# Patient Record
Sex: FEMALE | Race: OTHER | NOT HISPANIC OR LATINO | URBAN - METROPOLITAN AREA
[De-identification: names, ages, dates, MRNs, and addresses within clinical notes are randomized per-mention and may not be internally consistent; named-entity substitution may affect disease eponyms.]

---

## 2017-02-18 ENCOUNTER — OUTPATIENT (OUTPATIENT)
Dept: OUTPATIENT SERVICES | Facility: HOSPITAL | Age: 62
LOS: 1 days | End: 2017-02-18
Payer: COMMERCIAL

## 2017-02-18 ENCOUNTER — APPOINTMENT (OUTPATIENT)
Dept: RADIOLOGY | Facility: CLINIC | Age: 62
End: 2017-02-18

## 2017-02-18 DIAGNOSIS — R05 COUGH: ICD-10-CM

## 2017-02-18 PROCEDURE — 72110 X-RAY EXAM L-2 SPINE 4/>VWS: CPT

## 2019-02-24 ENCOUNTER — EMERGENCY (EMERGENCY)
Facility: HOSPITAL | Age: 64
LOS: 0 days | Discharge: HOME | End: 2019-02-24
Attending: EMERGENCY MEDICINE | Admitting: EMERGENCY MEDICINE
Payer: COMMERCIAL

## 2019-02-24 VITALS
HEIGHT: 64 IN | TEMPERATURE: 98 F | WEIGHT: 139.99 LBS | HEART RATE: 84 BPM | OXYGEN SATURATION: 97 % | SYSTOLIC BLOOD PRESSURE: 123 MMHG | DIASTOLIC BLOOD PRESSURE: 60 MMHG | RESPIRATION RATE: 18 BRPM

## 2019-02-24 DIAGNOSIS — D51.0 VITAMIN B12 DEFICIENCY ANEMIA DUE TO INTRINSIC FACTOR DEFICIENCY: ICD-10-CM

## 2019-02-24 DIAGNOSIS — R10.9 UNSPECIFIED ABDOMINAL PAIN: ICD-10-CM

## 2019-02-24 DIAGNOSIS — E03.9 HYPOTHYROIDISM, UNSPECIFIED: ICD-10-CM

## 2019-02-24 LAB
ALBUMIN SERPL ELPH-MCNC: 4.4 G/DL — SIGNIFICANT CHANGE UP (ref 3.3–5)
ALP SERPL-CCNC: 65 U/L — SIGNIFICANT CHANGE UP (ref 40–120)
ALT FLD-CCNC: 17 U/L — SIGNIFICANT CHANGE UP (ref 12–78)
ANION GAP SERPL CALC-SCNC: 7 MMOL/L — SIGNIFICANT CHANGE UP (ref 5–17)
APPEARANCE UR: CLEAR — SIGNIFICANT CHANGE UP
APTT BLD: 30.4 SEC — SIGNIFICANT CHANGE UP (ref 27.5–36.3)
AST SERPL-CCNC: 18 U/L — SIGNIFICANT CHANGE UP (ref 15–37)
BACTERIA # UR AUTO: ABNORMAL
BASOPHILS # BLD AUTO: 0.06 K/UL — SIGNIFICANT CHANGE UP (ref 0–0.2)
BASOPHILS NFR BLD AUTO: 0.7 % — SIGNIFICANT CHANGE UP (ref 0–2)
BILIRUB SERPL-MCNC: 0.5 MG/DL — SIGNIFICANT CHANGE UP (ref 0.2–1.2)
BILIRUB UR-MCNC: NEGATIVE — SIGNIFICANT CHANGE UP
BUN SERPL-MCNC: 14 MG/DL — SIGNIFICANT CHANGE UP (ref 7–23)
CALCIUM SERPL-MCNC: 9 MG/DL — SIGNIFICANT CHANGE UP (ref 8.5–10.1)
CHLORIDE SERPL-SCNC: 105 MMOL/L — SIGNIFICANT CHANGE UP (ref 96–108)
CO2 SERPL-SCNC: 27 MMOL/L — SIGNIFICANT CHANGE UP (ref 22–31)
COLOR SPEC: YELLOW — SIGNIFICANT CHANGE UP
CREAT SERPL-MCNC: 0.76 MG/DL — SIGNIFICANT CHANGE UP (ref 0.5–1.3)
DIFF PNL FLD: NEGATIVE — SIGNIFICANT CHANGE UP
EOSINOPHIL # BLD AUTO: 0.18 K/UL — SIGNIFICANT CHANGE UP (ref 0–0.5)
EOSINOPHIL NFR BLD AUTO: 2.1 % — SIGNIFICANT CHANGE UP (ref 0–6)
EPI CELLS # UR: SIGNIFICANT CHANGE UP
GLUCOSE SERPL-MCNC: 97 MG/DL — SIGNIFICANT CHANGE UP (ref 70–99)
GLUCOSE UR QL: NEGATIVE MG/DL — SIGNIFICANT CHANGE UP
HCT VFR BLD CALC: 40.2 % — SIGNIFICANT CHANGE UP (ref 34.5–45)
HGB BLD-MCNC: 13 G/DL — SIGNIFICANT CHANGE UP (ref 11.5–15.5)
IMM GRANULOCYTES NFR BLD AUTO: 0.1 % — SIGNIFICANT CHANGE UP (ref 0–1.5)
INR BLD: 1.12 RATIO — SIGNIFICANT CHANGE UP (ref 0.88–1.16)
KETONES UR-MCNC: ABNORMAL
LEUKOCYTE ESTERASE UR-ACNC: ABNORMAL
LIDOCAIN IGE QN: 90 U/L — SIGNIFICANT CHANGE UP (ref 73–393)
LYMPHOCYTES # BLD AUTO: 2.95 K/UL — SIGNIFICANT CHANGE UP (ref 1–3.3)
LYMPHOCYTES # BLD AUTO: 33.6 % — SIGNIFICANT CHANGE UP (ref 13–44)
MCHC RBC-ENTMCNC: 28.3 PG — SIGNIFICANT CHANGE UP (ref 27–34)
MCHC RBC-ENTMCNC: 32.3 GM/DL — SIGNIFICANT CHANGE UP (ref 32–36)
MCV RBC AUTO: 87.6 FL — SIGNIFICANT CHANGE UP (ref 80–100)
MONOCYTES # BLD AUTO: 0.58 K/UL — SIGNIFICANT CHANGE UP (ref 0–0.9)
MONOCYTES NFR BLD AUTO: 6.6 % — SIGNIFICANT CHANGE UP (ref 2–14)
NEUTROPHILS # BLD AUTO: 4.99 K/UL — SIGNIFICANT CHANGE UP (ref 1.8–7.4)
NEUTROPHILS NFR BLD AUTO: 56.9 % — SIGNIFICANT CHANGE UP (ref 43–77)
NITRITE UR-MCNC: NEGATIVE — SIGNIFICANT CHANGE UP
NRBC # BLD: 0 /100 WBCS — SIGNIFICANT CHANGE UP (ref 0–0)
PH UR: 5 — SIGNIFICANT CHANGE UP (ref 5–8)
PLATELET # BLD AUTO: 234 K/UL — SIGNIFICANT CHANGE UP (ref 150–400)
POTASSIUM SERPL-MCNC: 3.6 MMOL/L — SIGNIFICANT CHANGE UP (ref 3.5–5.3)
POTASSIUM SERPL-SCNC: 3.6 MMOL/L — SIGNIFICANT CHANGE UP (ref 3.5–5.3)
PROT SERPL-MCNC: 8.2 GM/DL — SIGNIFICANT CHANGE UP (ref 6–8.3)
PROT UR-MCNC: 15 MG/DL
PROTHROM AB SERPL-ACNC: 12.5 SEC — SIGNIFICANT CHANGE UP (ref 10–12.9)
RBC # BLD: 4.59 M/UL — SIGNIFICANT CHANGE UP (ref 3.8–5.2)
RBC # FLD: 12.8 % — SIGNIFICANT CHANGE UP (ref 10.3–14.5)
RBC CASTS # UR COMP ASSIST: NEGATIVE /HPF — SIGNIFICANT CHANGE UP (ref 0–4)
SODIUM SERPL-SCNC: 139 MMOL/L — SIGNIFICANT CHANGE UP (ref 135–145)
SP GR SPEC: 1.02 — SIGNIFICANT CHANGE UP (ref 1.01–1.02)
UROBILINOGEN FLD QL: NEGATIVE MG/DL — SIGNIFICANT CHANGE UP
WBC # BLD: 8.77 K/UL — SIGNIFICANT CHANGE UP (ref 3.8–10.5)
WBC # FLD AUTO: 8.77 K/UL — SIGNIFICANT CHANGE UP (ref 3.8–10.5)
WBC UR QL: SIGNIFICANT CHANGE UP

## 2019-02-24 PROCEDURE — 74177 CT ABD & PELVIS W/CONTRAST: CPT | Mod: 26

## 2019-02-24 PROCEDURE — 99285 EMERGENCY DEPT VISIT HI MDM: CPT

## 2019-02-24 RX ORDER — SODIUM CHLORIDE 9 MG/ML
1000 INJECTION INTRAMUSCULAR; INTRAVENOUS; SUBCUTANEOUS ONCE
Qty: 0 | Refills: 0 | Status: COMPLETED | OUTPATIENT
Start: 2019-02-24 | End: 2019-02-24

## 2019-02-24 RX ADMIN — SODIUM CHLORIDE 1000 MILLILITER(S): 9 INJECTION INTRAMUSCULAR; INTRAVENOUS; SUBCUTANEOUS at 18:11

## 2019-02-24 RX ADMIN — SODIUM CHLORIDE 1000 MILLILITER(S): 9 INJECTION INTRAMUSCULAR; INTRAVENOUS; SUBCUTANEOUS at 17:11

## 2019-02-24 NOTE — ED ADULT NURSE NOTE - CHIEF COMPLAINT QUOTE
pt sent in by [facility] for eval of possible SBO. pt reports worsening lower abdominal pain x 1 wk w/ nausea, no vomiting. pain 5/10 radiates to back

## 2019-02-24 NOTE — ED ADULT NURSE NOTE - CHPI ED NUR SYMPTOMS NEG
no dysuria/no fever/no chills/no nausea/no abdominal distension/no diarrhea/no blood in stool/no burning urination/no hematuria/no vomiting

## 2019-02-24 NOTE — ED ADULT TRIAGE NOTE - CHIEF COMPLAINT QUOTE
pt sent in by [facility] for eval of possible SBO. pt reports worsening lower abdominal pain x 1 wk w/ nausea, no vomiting. pain 5/10 radiates to back

## 2019-02-24 NOTE — ED STATDOCS - PROGRESS NOTE DETAILS
Patient seen and evaluated.  Reviewed labs and CT results with patient.  She does admit that she has felt bloating and discomfort in her pelvic area for many months now but it has been worse over the past few days, this could be consistent with possible pelvic congestion syndrome seen on CT.  Recommended follow up with ob/gyn for further eval and management.  Incidental of pancreatic lesion reviewed with patient as well, she has a follow up with her GI doctor next week.  Copy of results provided to patient -Garima Mckeon PA-C

## 2019-02-24 NOTE — ED STATDOCS - CLINICAL SUMMARY MEDICAL DECISION MAKING FREE TEXT BOX
patient with abdominal pain getting worse over the past few days.  CT scan with no acute findings, she will follow up for ob/gyn and GI to address incidentals.  Return precautions and PMD follow up reviewed.

## 2019-02-24 NOTE — ED STATDOCS - OBJECTIVE STATEMENT
62 y/o female with a PMHx of hypothyroidism on synthroid, pernicious anemia on B12 shots presents to the ED c/o abd pain, constipation described as fullness x1 week. +back pain. Sent in from urgent care today for possible SBO. Pt states that had x-ray at urgent care, brought into ED on disc. Pt notes that even after voiding she still feels constipated and "full." Pt had pain radiating into chest last week, went to cardiologist last week. No hx abd surgeries. Pt uses laxatives frequently, has hx of constipation, this week laxatives are not relieving feeling of constipation.

## 2019-09-16 PROBLEM — D51.0 VITAMIN B12 DEFICIENCY ANEMIA DUE TO INTRINSIC FACTOR DEFICIENCY: Chronic | Status: ACTIVE | Noted: 2019-02-24

## 2019-09-16 PROBLEM — E03.9 HYPOTHYROIDISM, UNSPECIFIED: Chronic | Status: ACTIVE | Noted: 2019-02-24

## 2019-10-01 ENCOUNTER — APPOINTMENT (OUTPATIENT)
Dept: ORTHOPEDIC SURGERY | Facility: CLINIC | Age: 64
End: 2019-10-01
Payer: COMMERCIAL

## 2019-10-01 VITALS
DIASTOLIC BLOOD PRESSURE: 72 MMHG | HEIGHT: 64 IN | SYSTOLIC BLOOD PRESSURE: 118 MMHG | HEART RATE: 71 BPM | BODY MASS INDEX: 25.61 KG/M2 | WEIGHT: 150 LBS

## 2019-10-01 DIAGNOSIS — Z80.42 FAMILY HISTORY OF MALIGNANT NEOPLASM OF PROSTATE: ICD-10-CM

## 2019-10-01 DIAGNOSIS — Z80.52 FAMILY HISTORY OF MALIGNANT NEOPLASM OF BLADDER: ICD-10-CM

## 2019-10-01 DIAGNOSIS — Z86.2 PERSONAL HISTORY OF DISEASES OF THE BLOOD AND BLOOD-FORMING ORGANS AND CERTAIN DISORDERS INVOLVING THE IMMUNE MECHANISM: ICD-10-CM

## 2019-10-01 DIAGNOSIS — Z78.9 OTHER SPECIFIED HEALTH STATUS: ICD-10-CM

## 2019-10-01 DIAGNOSIS — Z86.39 PERSONAL HISTORY OF OTHER ENDOCRINE, NUTRITIONAL AND METABOLIC DISEASE: ICD-10-CM

## 2019-10-01 DIAGNOSIS — Z82.61 FAMILY HISTORY OF ARTHRITIS: ICD-10-CM

## 2019-10-01 DIAGNOSIS — Z80.1 FAMILY HISTORY OF MALIGNANT NEOPLASM OF TRACHEA, BRONCHUS AND LUNG: ICD-10-CM

## 2019-10-01 PROCEDURE — 99204 OFFICE O/P NEW MOD 45 MIN: CPT

## 2019-10-01 PROCEDURE — 72100 X-RAY EXAM L-S SPINE 2/3 VWS: CPT

## 2019-10-01 NOTE — ADDENDUM
[FreeTextEntry1] : Documented by Mena Gongora acting as a scribe for Nithya Magallon NP on 10/01/2019.\par All medical record entries made by the Scribe were at my, Nithya Magallon NP, direction and personally dictated by me on 10/01/2019. I have reviewed the chart and agree that the record accurately reflects my personal performance of the history, physical exam, assessment and plan. I have also personally directed, reviewed, and agreed with the chart.

## 2019-10-01 NOTE — DISCUSSION/SUMMARY
[de-identified] : 64 year old female presents for lumbar myositis and left greater trochanteric bursitis. Pt has been referred to physical therapy for decreased pain modalities, core strengthening modalities and physical modalities of low back and bilateral hips. It was recommended that pt do home exercises such as stretching and hip strengthening. Meloxicam has been prescribed BID for pain relief and the pt was advised to continue with use of Tylenol prn. We also spoke about the benefits of using heat, ice, and Bengay cream.  A discussion was had about a possible US guided cortisone injection for the left greater trochanteric bursitis. The patient would like to move forward with the procedure. The injection was ordered for the patient in the office today. A lumbar MRI has been ordered secondary to persistent pain and is medically necessary to determine a further treatment plan and to evaluate for possible disc herniation. The pt will follow up in 3-4 weeks and/ or once MRI has been obtained.

## 2019-10-01 NOTE — HISTORY OF PRESENT ILLNESS
[de-identified] : 64 year old female presents for lumbar spine pain and left hip pain for approximately 2 years, worsening overtime. Pt reports her pain radiates down to her LLE. The pain is described as sharp, shooting, stabbing and constant. She also has intermittent numbness and tingling in her LLE. Pt reports her pain limits her from being active and that she needs to modify her activities daily. Lifting, bending and walking exacerbates the pain and it is somewhat alleviated by medication and rest. Pt has not tried PT, chiropractic care or pain management at this time. She has tried using a heating pad and taking Naproxen and Tylenol which has provided little to no relief.  [Ataxia] : no ataxia [Incontinence] : no incontinence [Loss of Dexterity] : good dexterity [Urinary Ret.] : no urinary retention

## 2019-10-01 NOTE — PHYSICAL EXAM
[de-identified] : CONSTITUTIONAL: The patient is a very pleasant individual who is well-nourished and who appears stated age.\par PSYCHIATRIC: The patient is alert and oriented X 3 and in no apparent distress, and participates with orthopedic evaluation well.\par HEAD: Atraumatic and is nonsyndromic in appearance.\par EENT: No visible thyromegaly, EOMI.\par RESPIRATORY: Respiratory rate is regular, not dyspneic on examination.\par LYMPHATICS: There is no inguinal lymphadenopathy\par INTEGUMENTARY: Skin is clean, dry, and intact about the bilateral lower extremities and lumbar spine.\par VASCULAR: There is brisk capillary refill about the bilateral lower extremities.\par NEUROLOGIC: There are no pathologic reflexes. There is no decrease in sensation of the bilateral lower extremities on Wartenberg pinwheel examination. Deep tendon reflexes are well maintained at 2+/4 of the bilateral lower extremities and are symmetric.\par MUSCULOSKELETAL: There is no visible muscular atrophy. Manual motor strength is well maintained in the bilateral lower extremities. Range of motion of lumbar spine is well maintained. The patient ambulates in a non-myelopathic manner. Negative tension sign and straight leg raise bilaterally. Quad extension, ankle dorsiflexion, EHL, plantar flexion, and ankle eversion are well preserved. Normal secondary orthopaedic exam of knees and ankles.\par \par Hip flexion up to 80 degrees bilaterally. \par Pain with internal rotation to left and right hip. Left hip pain with external rotation pain. \par Diffuse tenderness to left greater trochanteric region and across lumbar spine.  [de-identified] : Xray of the lumbar spine taken today shows moderate rotoscoliosis and age appropriate DDD.

## 2019-10-18 ENCOUNTER — FORM ENCOUNTER (OUTPATIENT)
Age: 64
End: 2019-10-18

## 2019-10-19 ENCOUNTER — APPOINTMENT (OUTPATIENT)
Dept: MRI IMAGING | Facility: CLINIC | Age: 64
End: 2019-10-19
Payer: COMMERCIAL

## 2019-10-19 ENCOUNTER — OUTPATIENT (OUTPATIENT)
Dept: OUTPATIENT SERVICES | Facility: HOSPITAL | Age: 64
LOS: 1 days | End: 2019-10-19
Payer: COMMERCIAL

## 2019-10-19 DIAGNOSIS — M47.816 SPONDYLOSIS WITHOUT MYELOPATHY OR RADICULOPATHY, LUMBAR REGION: ICD-10-CM

## 2019-10-19 PROCEDURE — 72148 MRI LUMBAR SPINE W/O DYE: CPT | Mod: 26

## 2019-10-19 PROCEDURE — 72148 MRI LUMBAR SPINE W/O DYE: CPT

## 2019-11-01 ENCOUNTER — APPOINTMENT (OUTPATIENT)
Dept: ORTHOPEDIC SURGERY | Facility: CLINIC | Age: 64
End: 2019-11-01
Payer: COMMERCIAL

## 2019-11-01 VITALS
WEIGHT: 150 LBS | DIASTOLIC BLOOD PRESSURE: 68 MMHG | HEART RATE: 77 BPM | HEIGHT: 64 IN | SYSTOLIC BLOOD PRESSURE: 122 MMHG | BODY MASS INDEX: 25.61 KG/M2

## 2019-11-01 DIAGNOSIS — M70.62 TROCHANTERIC BURSITIS, LEFT HIP: ICD-10-CM

## 2019-11-01 DIAGNOSIS — M47.816 SPONDYLOSIS W/OUT MYELOPATHY OR RADICULOPATHY, LUMBAR REGION: ICD-10-CM

## 2019-11-01 DIAGNOSIS — M25.551 PAIN IN RIGHT HIP: ICD-10-CM

## 2019-11-01 DIAGNOSIS — M25.552 PAIN IN RIGHT HIP: ICD-10-CM

## 2019-11-01 DIAGNOSIS — G56.80 OTHER SPECIFIED MONONEUROPATHIES OF UNSPECIFIED UPPER LIMB: ICD-10-CM

## 2019-11-01 PROCEDURE — 99214 OFFICE O/P EST MOD 30 MIN: CPT

## 2019-11-01 NOTE — ADDENDUM
[FreeTextEntry1] : Documented by Mena Gongora acting as a scribe for Dr. Damon Kern. 11/01/2019\par \par All medical record entries made by the Scribe were at my, Dr. Damon Kern, direction and personally dictated by me on 11/01/2019. I have reviewed the chart and agree that the record accurately reflects my personal performance of the history, physical exam, assessment and plan. I have also personally directed, reviewed, and agreed with the chart.

## 2019-11-01 NOTE — HISTORY OF PRESENT ILLNESS
[de-identified] : 64 year old female presents for MRI review and follow up: lumbar spine pain and left hip pain for approximately 2 years, worsening overtime. She has a new c/o of left scapulothoracic pain. Pt reports her pain radiates down to her LLE. The pain is described as sharp, shooting, stabbing and constant. She also has intermittent numbness and tingling in her LLE. Pt reports her pain limits her from being active and that she needs to modify her activities daily. Lifting, bending and walking exacerbates the pain and it is somewhat alleviated by medication and rest. Pt has not tried PT, chiropractic care or pain management at this time. She has tried using a heating pad and taking Naproxen and Tylenol which has provided little to no relief.  [Ataxia] : no ataxia [Incontinence] : no incontinence [Loss of Dexterity] : good dexterity [Urinary Ret.] : no urinary retention

## 2019-11-01 NOTE — DISCUSSION/SUMMARY
[de-identified] : Based upon imaging review, pt does not have any spine surgical indications at this time. Therefore, I have recommended that the pt continue with a conservative treatment plan. Pt has been referred to physical therapy for decreased pain modalities, core strengthening modalities and physical modalities. She was referred to physiatry for physical optimization regarding b/l groin pain, greater trochanteric bursitis and pain management injections. She may follow up PRN for repeat clinical evaluation.

## 2019-11-01 NOTE — PHYSICAL EXAM
[de-identified] : CONSTITUTIONAL: The patient is a very pleasant individual who is well-nourished and who appears stated age.\par PSYCHIATRIC: The patient is alert and oriented X 3 and in no apparent distress, and participates with orthopedic evaluation well.\par HEAD: Atraumatic and is nonsyndromic in appearance.\par EENT: No visible thyromegaly, EOMI.\par RESPIRATORY: Respiratory rate is regular, not dyspneic on examination.\par LYMPHATICS: There is no inguinal lymphadenopathy\par INTEGUMENTARY: Skin is clean, dry, and intact about the bilateral lower extremities and lumbar spine.\par VASCULAR: There is brisk capillary refill about the bilateral lower extremities.\par NEUROLOGIC: There are no pathologic reflexes. There is no decrease in sensation of the bilateral lower extremities on Wartenberg pinwheel examination. Deep tendon reflexes are well maintained at 2+/4 of the bilateral lower extremities and are symmetric.\par MUSCULOSKELETAL: There is no visible muscular atrophy. Manual motor strength is well maintained in the bilateral lower extremities. Range of motion of lumbar spine is well maintained. The patient ambulates in a non-myelopathic manner. Negative tension sign and straight leg raise bilaterally. Quad extension, ankle dorsiflexion, EHL, plantar flexion, and ankle eversion are well preserved. Normal secondary orthopaedic exam of knees and ankles.\par Hip flexion up to 80 degrees bilaterally. \par Pain with internal rotation to left and right hip. Bilateral groin pain with external rotation pain. \par Diffuse tenderness to left greater trochanteric region and across lumbar spine.  [de-identified] : MRI of the lumbar spine taken at [facility] on 10/19/19 shows no significant central/ lateral recess stenosis. Overall mild-moderate DDD noted.

## 2021-05-11 ENCOUNTER — EMERGENCY (EMERGENCY)
Facility: HOSPITAL | Age: 66
LOS: 0 days | Discharge: HOME | End: 2021-05-11
Attending: EMERGENCY MEDICINE
Payer: COMMERCIAL

## 2021-05-11 VITALS
TEMPERATURE: 98 F | RESPIRATION RATE: 16 BRPM | DIASTOLIC BLOOD PRESSURE: 70 MMHG | HEART RATE: 68 BPM | SYSTOLIC BLOOD PRESSURE: 102 MMHG | OXYGEN SATURATION: 98 %

## 2021-05-11 VITALS — WEIGHT: 145.06 LBS | HEIGHT: 64 IN
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
## 2021-05-11 DIAGNOSIS — M25.572 PAIN IN LEFT ANKLE AND JOINTS OF LEFT FOOT: ICD-10-CM

## 2021-05-11 DIAGNOSIS — S92.902A UNSPECIFIED FRACTURE OF LEFT FOOT, INITIAL ENCOUNTER FOR CLOSED FRACTURE: ICD-10-CM

## 2021-05-11 DIAGNOSIS — E03.9 HYPOTHYROIDISM, UNSPECIFIED: ICD-10-CM

## 2021-05-11 DIAGNOSIS — D51.0 VITAMIN B12 DEFICIENCY ANEMIA DUE TO INTRINSIC FACTOR DEFICIENCY: ICD-10-CM

## 2021-05-11 PROCEDURE — 99284 EMERGENCY DEPT VISIT MOD MDM: CPT

## 2021-05-11 PROCEDURE — 73630 X-RAY EXAM OF FOOT: CPT | Mod: 26,LT

## 2021-05-11 PROCEDURE — 99284 EMERGENCY DEPT VISIT MOD MDM: CPT | Mod: 25

## 2021-05-11 PROCEDURE — 73610 X-RAY EXAM OF ANKLE: CPT | Mod: 26,LT

## 2021-05-11 PROCEDURE — 73630 X-RAY EXAM OF FOOT: CPT | Mod: LT

## 2021-05-11 PROCEDURE — 73610 X-RAY EXAM OF ANKLE: CPT | Mod: LT

## 2021-05-11 NOTE — ED STATDOCS - PROGRESS NOTE DETAILS
Patient seen and evaluated.  Bulky dressing with fracture shoe placed to left foot to treat avulsion fracture to dorsal foot.  Patient to follow up with orthopedic in the office.  REviewed KAITLYN, pain control, return precautions -Garima Mckeon PA-C

## 2021-05-11 NOTE — ED ADULT TRIAGE NOTE - CHIEF COMPLAINT QUOTE
Pt presents with left ankle injury, states she was able to bear weight on it, pt has had both pfizer vaccines

## 2021-05-11 NOTE — ED STATDOCS - ATTENDING CONTRIBUTION TO CARE
I, Angelica Carpenter MD,  performed the initial face to face bedside interview with this patient regarding history of present illness, review of symptoms and relevant past medical, social and family history.  I completed an independent physical examination.  I was the initial provider who evaluated this patient. I have signed out the follow up of any pending tests (i.e. labs, radiological studies) to the ACP.  I have communicated the patient’s plan of care and disposition with the ACP.  The history, relevant review of systems, past medical and surgical history, medical decision making, and physical examination was documented by the scribe in my presence and I attest to the accuracy of the documentation.

## 2021-05-11 NOTE — ED STATDOCS - MUSCULOSKELETAL, MLM
+ecchymosis over left lateral dorsum of foot, skin intact, distal neurovascularly intact, no pain to proximal tib fib

## 2021-05-11 NOTE — ED STATDOCS - PATIENT PORTAL LINK FT
You can access the FollowMyHealth Patient Portal offered by [facility] by registering at the following website: [email or web address]. By joining [facility]’s FollowMyHealth portal, you will also be able to view your health information using other applications (apps) compatible with our system.

## 2021-05-11 NOTE — ED STATDOCS - CARE PROVIDER_API CALL
Kaveh Conley (DO)  Orthopaedic Surgery  [address] [place] [address]  Phone: [phone]  Fax: [phone]  Follow Up Time:

## 2021-05-11 NOTE — ED STATDOCS - OBJECTIVE STATEMENT
66 y/o female with PMHx of hypothyroidism presents to the Ed c/o left ankle injury. Pt states that while bringing groceries home last night, she slipped on rug and fell, landing on right shoulder and twisting left ankle. Denies head strike and LOC. Pt reports difficulty ambulating 2/2 pain, but notes she is able to put some weight on leg. Denies blood thinner use. Pt reports a history of right ankle fracture, unsure if current pain similar to previous episode of fracture. Pt notes that she took Ibuprofen PTA. Denies headache, dizziness, back or neck pain, n/v/d, abd pain, chest pain, SOB. No other injuries or complaints at this time. Pt states she has received both doses of Pfizer COVID vaccine.

## 2021-05-11 NOTE — ED STATDOCS - NSFOLLOWUPINSTRUCTIONS_ED_ALL_ED_FT
Avulsion Fracture    WHAT YOU NEED TO KNOW:    An avulsion fracture is when a small piece of bone breaks and pulls away from a larger bone. Part or all of the piece may break away.    DISCHARGE INSTRUCTIONS:    Call your local emergency number (911 in the [place]) if:   •You feel lightheaded, short of breath, and have chest pain.      •You cough up blood.      Return to the emergency department if:   •Your leg feels warm, tender, and painful. It may look swollen and red.      •Your cast cracks or is damaged.      •The pain in your injured limb gets worse even after you rest and take medicine.      •The skin, toes, or fingers of your injured limb become swollen, cold, or blue.      Call your doctor if:   •You have numbness or tingling in your hand or foot below your cast.      •You cannot move your fingers or toes below the cast.       •You have new sores or redness around your cast or splint.      •You have new or worsening trouble moving your injured limb.      •You have questions or concerns about your condition or care.      Medicines: You may need any of the following:   •Prescription pain medicine may be given. Ask your healthcare provider how to take this medicine safely. Some prescription pain medicines contain acetaminophen. Do not take other medicines that contain acetaminophen without talking to your healthcare provider. Too much acetaminophen may cause liver damage. Prescription pain medicine may cause constipation. Ask your healthcare provider how to prevent or treat constipation.       •NSAIDs, such as ibuprofen, help decrease swelling, pain, and fever. This medicine is available with or without a doctor's order. NSAIDs can cause stomach bleeding or kidney problems in certain people. If you take blood thinner medicine, always ask your healthcare provider if NSAIDs are safe for you. Always read the medicine label and follow directions.      •Take your medicine as directed. Contact your healthcare provider if you think your medicine is not helping or if you have side effects. Tell him or her if you are allergic to any medicine. Keep a list of the medicines, vitamins, and herbs you take. Include the amounts, and when and why you take them. Bring the list or the pill bottles to follow-up visits. Carry your medicine list with you in case of an emergency.      Self-care:   •Limit activity as directed. Get plenty of rest while your fracture heals. When the pain decreases, begin normal, slow movements. Slowly start to do more each day. Rest when you feel it is needed.       •Apply ice on your injury for 15 to 20 minutes every hour or as directed. Use an ice pack, or put crushed ice in a plastic bag. Cover it with a towel. Ice helps prevent tissue damage and decreases swelling and pain.      •Elevate your injured limb above the level of your heart as often as you can. This will help decrease swelling and pain. Prop your injured limb on pillows or blankets to keep it elevated comfortably.       •Use support devices as directed. You may need to use crutches or a walker until your fracture heals. Ask for more information about how to use these walking devices if needed.      Bathing with a cast or splint: If you have a cast or splint, it is important not to get it wet. Before bathing, cover the cast or splint with a plastic bag. Tape the bag to your skin above the cast or splint to seal out the water. Hold your arm or leg away from the water in case the bag leaks. Ask when it is okay to take a bath or shower.    Cast or splint care:   •Check the skin around the cast or splint every day.      •Do not push down or lean on any part of the cast or splint because it may break.      •Do not use a sharp or pointed object to scratch your skin under the cast or splint.      Physical therapy: A physical therapist may teach you exercises to strengthen your injured limb once the pain is gone.     Follow up with your doctor as directed: You will need to return for more tests to see how well your fracture is healing. Write down your questions so you remember to ask them during your visits.

## 2021-05-12 ENCOUNTER — NON-APPOINTMENT (OUTPATIENT)
Age: 66
End: 2021-05-12

## 2021-05-12 ENCOUNTER — APPOINTMENT (OUTPATIENT)
Dept: ORTHOPEDIC SURGERY | Facility: CLINIC | Age: 66
End: 2021-05-12
Payer: COMMERCIAL

## 2021-05-12 PROCEDURE — 97760 ORTHOTIC MGMT&TRAING 1ST ENC: CPT | Mod: GP

## 2021-05-12 PROCEDURE — 99072 ADDL SUPL MATRL&STAF TM PHE: CPT

## 2021-05-12 PROCEDURE — 99214 OFFICE O/P EST MOD 30 MIN: CPT | Mod: 25

## 2021-05-12 NOTE — HISTORY OF PRESENT ILLNESS
[FreeTextEntry1] : GURVINDER GUPTA is a 65 year female who presents for initial evaluation of right ankle pain/injury. Patient fell outside her house and stepped in a ditch. She was evaluated at [facility]. She was told she had an avulsion fracture.  Pt presents in a stiff shoe. Her pain is a 8/10 that is throbbing.

## 2021-05-12 NOTE — ADDENDUM
[FreeTextEntry1] : I, Joseph Pisano, acted solely as a scribe for Dr. Kaveh Conley on this date 05/12/2021  .\par  \par All medical record entries made by the Scribe were at my, Dr. Kaveh Conley, direction and personally dictated by me on 05/12/2021 . I have reviewed the chart and agree that the record accurately reflects my personal performance of the history, physical exam, assessment and plan. I have also personally directed, reviewed, and agreed with the chart.

## 2021-05-12 NOTE — PHYSICAL EXAM
[de-identified] : General: Alert and oriented x3. In no acute distress. Pleasant in nature with a normal affect. No apparent respiratory distress.\par \par Left Ankle\par Skin: Clean, dry, intact\par Inspection: No obvious malalignment, + swelling, no effusion; no lymphadenopathy\par Pulses: 2+ DP/PT pulses\par ROM: 10 degrees of dorsiflexion, 40 degrees of plantarflexion, 10 degrees of subtalar motion\par Tenderness: + Anterior ankle, lateral process. No tenderness over the lateral malleolus, no CFL/ATFL/PTFL pain. No medial malleolus pain, no deltoid ligament pain. No proximal fibular pain. No heel pain.\par Stability: Negative anterior/posterior drawer.\par Strength: 5/5 TA/GS/EHL\par Neuro: In tact to light touch throughout\par Additional tests: Negative Davidson's test, Negative syndesmosis squeeze test. [de-identified] : EXAM: XR FOOT COMP MIN 3 VIEWS LT\par EXAM: XR ANKLE COMP MIN 3 VIEWS LT\par \par \par PROCEDURE DATE: 05/11/2021\par \par INTERPRETATION: Left ankle and left foot. Patient fell with local trauma.\par \par Left ankle. 3 views.\par \par There is slight swelling off the lateral malleolus. The ankle is relatively free of degeneration. No bone destruction or fracture.\par \par Minimal inferior calcaneal spur.\par \par Left foot. 3 views.\par \par Slight degeneration at the first MTP joint.\par \par No fracture.\par \par IMPRESSION: No acute bony finding. Other findings as above.\par \par ROSA SIMON MD; Attending Radiologist\par This document has been electronically signed. May 11 2021 3:18PM

## 2021-05-12 NOTE — DISCUSSION/SUMMARY
[de-identified] : Today I had a lengthy discussion with the patient regarding their left ankle pain.I have addressed all the patient's concerns surrounding the pathology of their condition. XR films were reviewed with the patient. \par \par I recommended that the patient utilize a CAM boot. The patient was fitted for the CAM boot in the office today. The patient was educated about the boot wear pattern and utilization, as well as the timeframe to come out of the boot. She was also given full instructions for using the boot. I recommend that the patient utilize ice, NSAIDS PRN, and heat. They can also elevate their left ankle above the level of the heart. \par \par I would like to see the patient back in the office in 2 weeks to reassess their condition. \par \par The patient understood and verbally agreed to the treatment plan. All of their questions were answered and they were satisfied with the visit. The patient should call the office if they have any questions or experience worsening symptoms.

## 2021-05-24 ENCOUNTER — APPOINTMENT (OUTPATIENT)
Dept: ORTHOPEDIC SURGERY | Facility: CLINIC | Age: 66
End: 2021-05-24
Payer: COMMERCIAL

## 2021-05-24 PROCEDURE — 99072 ADDL SUPL MATRL&STAF TM PHE: CPT

## 2021-05-24 PROCEDURE — 99213 OFFICE O/P EST LOW 20 MIN: CPT

## 2021-06-03 NOTE — ED STATDOCS - CHPI ED TIMING
Teresa spoke with patient. She believes that pt should be evaluated. Appointment made for today.   gradual onset

## 2021-07-08 ENCOUNTER — APPOINTMENT (OUTPATIENT)
Dept: MRI IMAGING | Facility: CLINIC | Age: 66
End: 2021-07-08

## 2021-07-08 ENCOUNTER — OUTPATIENT (OUTPATIENT)
Dept: OUTPATIENT SERVICES | Facility: HOSPITAL | Age: 66
LOS: 1 days | End: 2021-07-08
Payer: MEDICARE

## 2021-07-08 DIAGNOSIS — K86.2 CYST OF PANCREAS: ICD-10-CM

## 2021-07-08 PROCEDURE — 74183 MRI ABD W/O CNTR FLWD CNTR: CPT

## 2021-07-08 PROCEDURE — 74183 MRI ABD W/O CNTR FLWD CNTR: CPT | Mod: 26,MH

## 2021-07-08 PROCEDURE — A9585: CPT

## 2021-07-28 ENCOUNTER — APPOINTMENT (OUTPATIENT)
Dept: ORTHOPEDIC SURGERY | Facility: CLINIC | Age: 66
End: 2021-07-28
Payer: MEDICARE

## 2021-07-28 DIAGNOSIS — S99.912A UNSPECIFIED INJURY OF LEFT ANKLE, INITIAL ENCOUNTER: ICD-10-CM

## 2021-07-28 DIAGNOSIS — S93.402A SPRAIN OF UNSPECIFIED LIGAMENT OF LEFT ANKLE, INITIAL ENCOUNTER: ICD-10-CM

## 2021-07-28 PROCEDURE — 99213 OFFICE O/P EST LOW 20 MIN: CPT

## 2021-07-28 NOTE — PHYSICAL EXAM
[de-identified] : General: Alert and oriented x3. In no acute distress. Pleasant in nature with a normal affect. No apparent respiratory distress.\par \par Left Ankle\par Skin: Clean, dry, intact\par Inspection: No obvious malalignment, + swelling, no effusion; no lymphadenopathy\par Pulses: 2+ DP/PT pulses\par ROM: 10 degrees of dorsiflexion, 40 degrees of plantarflexion, 10 degrees of subtalar motion\par Tenderness: + Improved Anterior ankle, lateral process. + Improved peroneals. No tenderness over the lateral malleolus, no CFL/ATFL/PTFL pain. No medial malleolus pain, no deltoid ligament pain. No proximal fibular pain. No heel pain. + 2nd TMT, + Lisfranc articulation, +lateral ligament\par Stability: Negative anterior/posterior drawer.\par Strength: 5/5 TA/GS/EHL\par Neuro: In tact to light touch throughout\par Additional tests: Negative Davidson's test, Negative syndesmosis squeeze test. [de-identified] : No new imaging.

## 2021-07-28 NOTE — ADDENDUM
[FreeTextEntry1] : I, Zonia Esquivel, acted solely as a scribe for Dr. Kaveh Conley on this date 07/28/2021.\par \par All medical record entries made by the Scribe were at my, Dr. Kaveh Conley, direction and personally dictated by me on 07/28/2021 . I have reviewed the chart and agree that the record accurately reflects my personal performance of the history, physical exam, assessment and plan. I have also personally directed, reviewed, and agreed with the chart.	\par

## 2021-07-28 NOTE — ADDENDUM
[FreeTextEntry1] : I, Joseph Pisano, acted solely as a scribe for Dr. Kaveh Conley on this date 05/24/2021  .\par  \par All medical record entries made by the Scribe were at my, Dr. Kaveh Conley, direction and personally dictated by me on 05/24/2021 . I have reviewed the chart and agree that the record accurately reflects my personal performance of the history, physical exam, assessment and plan. I have also personally directed, reviewed, and agreed with the chart.

## 2021-07-28 NOTE — PHYSICAL EXAM
[de-identified] : General: Alert and oriented x3. In no acute distress. Pleasant in nature with a normal affect. No apparent respiratory distress.\par \par Left Ankle\par Skin: Clean, dry, intact\par Inspection: No obvious malalignment, + swelling, no effusion; no lymphadenopathy\par Pulses: 2+ DP/PT pulses\par ROM: 10 degrees of dorsiflexion, 40 degrees of plantarflexion, 10 degrees of subtalar motion\par Tenderness: + Anterior ankle, lateral process. + peroneals. No tenderness over the lateral malleolus, no CFL/ATFL/PTFL pain. No medial malleolus pain, no deltoid ligament pain. No proximal fibular pain. No heel pain.\par Stability: Negative anterior/posterior drawer.\par Strength: 5/5 TA/GS/EHL\par Neuro: In tact to light touch throughout\par Additional tests: Negative Davidson's test, Negative syndesmosis squeeze test. [de-identified] : No new imaging.

## 2021-07-28 NOTE — HISTORY OF PRESENT ILLNESS
[FreeTextEntry1] : 5/24/2021: GURVINDER GUPTA is a 65 year old female who presents for follow-up evaluation of left ankle pain/injury. Patient reports of minimal improvement to the pain. She presents in a CAM boot.\par \par 5/12/2021: GURVINDER GUPTA is a 65 year female who presents for initial evaluation of left ankle pain/injury. Patient fell outside her house and stepped in a ditch. She was evaluated at [facility]. She was told she had an avulsion fracture.  Pt presents in a stiff shoe. Her pain is a 8/10 that is throbbing.

## 2021-07-28 NOTE — DISCUSSION/SUMMARY
[de-identified] : Today I had a lengthy discussion with the patient regarding their left ankle pain, left ankle sprain. I have addressed all the patient's concerns surrounding the pathology of their condition. \par \par I recommend that the patient transition out of their CAM boot and into an ASO brace as tolerated. The patient was provided with the ASO brace in the office today. I recommend the patient undergo a course of physical therapy for the left foot  2-3 times a week for a total of 6-8 weeks. A prescription was given for the physical therapy today. I recommend that the patient utilize ice, heat, NSAIDs prn. They can also elevate their left foot above the level of the heart. \par \par I would like to see the patient back in the office in 1 month to reassess their condition. The patient understood and verbally agreed to the treatment plan. This was explained in the presence of their . All of their questions were answered and they were satisfied with the visit. The patient should call the office if they have any questions or experience worsening symptoms.

## 2021-07-28 NOTE — HISTORY OF PRESENT ILLNESS
[FreeTextEntry1] : 7/28/21: GURVINDER GUPTA is a 66 year year old female presenting for an initial evaluation of left ankle pain. The patient states that her pain has improved over the past three days. The patient rates her pain as 3/10. She localizes the pain to the top of her foot. The patient has previously attended physical therapy for her ankles, and reports improvement in pain. Please see previous clinical note for further details.\par

## 2021-07-28 NOTE — DISCUSSION/SUMMARY
[de-identified] : Today I had a lengthy discussion with the patient regarding their left ankle pain.I have addressed all the patient's concerns surrounding the pathology of their condition. I recommend the patient undergo a course of physical therapy for the left ankle and foot 2-3 times a week for a total of 6-8 weeks. A prescription was given for the physical therapy today. I recommend that the patient utilize Voltaren gel topically. If the Voltaren gel could not be obtained, Icy Hot, Biofreeze, or Bengay can be utilized instead. I recommend that the patient utilize ice, NSAIDs, and heat PRN. They can also elevate their left ankle an foot above the level of the heart.		\par 			\par \par The patient understood and verbally agreed to the treatment plan. All of their questions were answered and they were satisfied with the visit. The patient should call the office if they have any questions or experience worsening symptoms. If there is no improvement in pain I would like to consider obtaining an MRI of the patient's left ankle. I would like to see the patient in 1-2 months PRN. 					\par

## 2021-11-29 NOTE — ED STATDOCS - CROS ED GI ALL NEG
What Type Of Note Output Would You Prefer (Optional)?: Standard Output
How Severe Is Your Rash?: moderate
Is This A New Presentation, Or A Follow-Up?: Rash
- - -

## 2022-03-12 ENCOUNTER — APPOINTMENT (OUTPATIENT)
Dept: MRI IMAGING | Facility: CLINIC | Age: 67
End: 2022-03-12
Payer: MEDICARE

## 2022-03-12 ENCOUNTER — OUTPATIENT (OUTPATIENT)
Dept: OUTPATIENT SERVICES | Facility: HOSPITAL | Age: 67
LOS: 1 days | End: 2022-03-12

## 2022-03-12 ENCOUNTER — RESULT REVIEW (OUTPATIENT)
Age: 67
End: 2022-03-12

## 2022-03-12 DIAGNOSIS — K86.2 CYST OF PANCREAS: ICD-10-CM

## 2022-03-12 PROCEDURE — 74183 MRI ABD W/O CNTR FLWD CNTR: CPT | Mod: 26

## 2022-03-21 PROBLEM — K86.2 PANCREAS CYST: Status: ACTIVE | Noted: 2021-08-03

## 2022-03-22 ENCOUNTER — APPOINTMENT (OUTPATIENT)
Dept: SURGERY | Facility: CLINIC | Age: 67
End: 2022-03-22
Payer: MEDICARE

## 2022-03-22 VITALS
BODY MASS INDEX: 25.61 KG/M2 | SYSTOLIC BLOOD PRESSURE: 126 MMHG | HEART RATE: 65 BPM | WEIGHT: 150 LBS | TEMPERATURE: 97.1 F | RESPIRATION RATE: 16 BRPM | HEIGHT: 64 IN | OXYGEN SATURATION: 99 % | DIASTOLIC BLOOD PRESSURE: 82 MMHG

## 2022-03-22 DIAGNOSIS — K86.2 CYST OF PANCREAS: ICD-10-CM

## 2022-03-22 LAB — CANCER AG19-9 SERPL-ACNC: 55 U/ML

## 2022-03-22 PROCEDURE — 99214 OFFICE O/P EST MOD 30 MIN: CPT

## 2022-03-25 NOTE — RESULTS/DATA
[FreeTextEntry1] : EXAM: [id number] - MR MRCP WAW IC  - ORDERED BY: CATHIE ALCOCER\par \par \par PROCEDURE DATE:  03/12/2022\par \par \par \par INTERPRETATION:  CLINICAL INFORMATION: Follow-up pancreatic cysts\par \par COMPARISON: 7/21 CT 2/19\par \par CONTRAST/COMPLICATIONS:\par IV Contrast: Gadavist  7 cc administered   0.5 cc discarded\par Oral Contrast: NONE\par Complications: None reported at time of study completion\par \par PROCEDURE:\par MRI of the abdomen was performed.\par MRCP was performed.\par \par FINDINGS:\par LOWER CHEST: Within normal limits.\par \par LIVER: Multiple hepatic cysts again identified\par BILE DUCTS: Normal caliber.\par GALLBLADDER: Within normal limits.\par SPLEEN: Within normal limits.\par PANCREAS: Stable distribution of scattered pancreatic cysts with stable appearance of mid body cyst measuring 14 mm\par ADRENALS: Within normal limits.\par KIDNEYS/URETERS: Bilateral cysts including left parapelvic renal cysts\par \par VISUALIZED PORTIONS:\par BOWEL: Within normal limits.\par PERITONEUM: No ascites.\par VESSELS: Within normal limits.\par RETROPERITONEUM/LYMPH NODES: No lymphadenopathy.\par ABDOMINAL WALL: Within normal limits.\par BONES: Within normal limits.\par \par IMPRESSION:\par Stable abdominal MRI with stable distribution of small pancreatic cysts suggesting I PMN's and hepatic cysts\par \par \par \par --- End of Report ---\par \par

## 2022-03-25 NOTE — HISTORY OF PRESENT ILLNESS
[de-identified] : Ms. GURVINDER GUPTA is a 66 year old woman who presents today for an initial consultation of a pancreatic cyst. Cyst has been present on imaging since 2019, found initially by her GI doctor. Denies any personal history of pancreatitis, no family hx of pancreatic ca. Daughter also being followed for IPMN. PMH of hypothyroidism, lumbar spondylosis and pernicious anemia. Denies nausea, vomiting, diarrhea or abdominal pain. Denies any unintentional weight loss. \par \par 3/10/22: Ca 19-9: 55\par MRCP 3/12/22: stable mid-body cysts measuring up to 14mm, compared to CT from 2019

## 2022-03-25 NOTE — ASSESSMENT
[FreeTextEntry1] : Ms. GURVINDER GUPTA is a 66 year old woman being seen today for an initial consultation of pancreatic cysts. Found by her GI doctor incidentally years ago. Daughter also being followed for IPMN surveillance. Recent MRCP from 3/12/22 showing stable mid-body cysts, largest measuring up to 14mm. Ca 19-9 elevated at 55, no other worrisome features.\par \par Plan:\par 1. Repeat Ca 19-9 in 2 months\par 2. Patient to be presented at benign cyst conference on 3/23

## 2022-03-25 NOTE — PHYSICAL EXAM
[Normal] : oriented to person, place and time, with appropriate affect [de-identified] : anicteric [de-identified] : supple [de-identified] : normal respiratory effort [de-identified] : normal appearance

## 2022-04-13 ENCOUNTER — APPOINTMENT (OUTPATIENT)
Dept: GASTROENTEROLOGY | Facility: CLINIC | Age: 67
End: 2022-04-13

## 2022-04-15 ENCOUNTER — OUTPATIENT (OUTPATIENT)
Dept: OUTPATIENT SERVICES | Facility: HOSPITAL | Age: 67
LOS: 1 days | End: 2022-04-15

## 2022-04-15 ENCOUNTER — RESULT REVIEW (OUTPATIENT)
Age: 67
End: 2022-04-15

## 2022-04-15 ENCOUNTER — APPOINTMENT (OUTPATIENT)
Dept: INTERVENTIONAL RADIOLOGY/VASCULAR | Facility: CLINIC | Age: 67
End: 2022-04-15
Payer: MEDICARE

## 2022-04-15 DIAGNOSIS — Z00.8 ENCOUNTER FOR OTHER GENERAL EXAMINATION: ICD-10-CM

## 2022-04-15 PROCEDURE — 10005 FNA BX W/US GDN 1ST LES: CPT

## 2022-04-16 PROCEDURE — 88173 CYTOPATH EVAL FNA REPORT: CPT | Mod: 26

## 2022-04-19 ENCOUNTER — TRANSCRIPTION ENCOUNTER (OUTPATIENT)
Age: 67
End: 2022-04-19

## 2022-04-21 LAB — NON-GYNECOLOGICAL CYTOLOGY STUDY: SIGNIFICANT CHANGE UP

## 2022-05-10 ENCOUNTER — LABORATORY RESULT (OUTPATIENT)
Age: 67
End: 2022-05-10

## 2022-05-17 ENCOUNTER — APPOINTMENT (OUTPATIENT)
Dept: SURGERY | Facility: CLINIC | Age: 67
End: 2022-05-17
Payer: MEDICARE

## 2022-05-17 VITALS
TEMPERATURE: 97.8 F | RESPIRATION RATE: 17 BRPM | BODY MASS INDEX: 25.61 KG/M2 | SYSTOLIC BLOOD PRESSURE: 115 MMHG | DIASTOLIC BLOOD PRESSURE: 74 MMHG | HEIGHT: 64 IN | HEART RATE: 71 BPM | WEIGHT: 150 LBS | OXYGEN SATURATION: 98 %

## 2022-05-17 PROCEDURE — 99214 OFFICE O/P EST MOD 30 MIN: CPT

## 2022-05-24 NOTE — PHYSICAL EXAM
[Normal] : oriented to person, place and time, with appropriate affect [de-identified] : anicteric [de-identified] : supple [de-identified] : normal respiratory effort [de-identified] : normal appearance

## 2022-05-24 NOTE — ADDENDUM
[FreeTextEntry1] : Patient presented at benign cyst conference on 3/23 with group recommendations to repeat Ca 19-9 in 2 months and repeat imaging in 1-year. Discussed plan with patient via telephone and in agreement with next steps

## 2022-05-24 NOTE — RESULTS/DATA
[FreeTextEntry1] : EXAM: [id number] - MR MRCP WAW IC  - ORDERED BY: CATHIE ALCOCER\par \par \par PROCEDURE DATE:  03/12/2022\par \par \par \par INTERPRETATION:  CLINICAL INFORMATION: Follow-up pancreatic cysts\par \par COMPARISON: 7/21 CT 2/19\par \par CONTRAST/COMPLICATIONS:\par IV Contrast: Gadavist  7 cc administered   0.5 cc discarded\par Oral Contrast: NONE\par Complications: None reported at time of study completion\par \par PROCEDURE:\par MRI of the abdomen was performed.\par MRCP was performed.\par \par FINDINGS:\par LOWER CHEST: Within normal limits.\par \par LIVER: Multiple hepatic cysts again identified\par BILE DUCTS: Normal caliber.\par GALLBLADDER: Within normal limits.\par SPLEEN: Within normal limits.\par PANCREAS: Stable distribution of scattered pancreatic cysts with stable appearance of mid body cyst measuring 14 mm\par ADRENALS: Within normal limits.\par KIDNEYS/URETERS: Bilateral cysts including left parapelvic renal cysts\par \par VISUALIZED PORTIONS:\par BOWEL: Within normal limits.\par PERITONEUM: No ascites.\par VESSELS: Within normal limits.\par RETROPERITONEUM/LYMPH NODES: No lymphadenopathy.\par ABDOMINAL WALL: Within normal limits.\par BONES: Within normal limits.\par \par IMPRESSION:\par Stable abdominal MRI with stable distribution of small pancreatic cysts suggesting I PMN's and hepatic cysts\par \par \par \par --- End of Report ---\par \par

## 2022-05-24 NOTE — ASSESSMENT
[FreeTextEntry1] : Ms. GURVINDER GUPTA is a 66 year old woman being seen today for a follow-up of SB-IPMN\par Found by her GI doctor incidentally in 2019. Most recent MRCP from 3/12/22 showing stable mid-body cysts, largest measuring up to 14mm.   Repeat Ca 19-9 48 (down from 55).   Pt with occasional LUQ pain and nausea that sometimes lasts 3-4 days. No hx of pancreatitis. Going to see her GI doctor soon.    \par \par Plan:\par 1.  Told patient to call our office if LUQ pain reoccurs/ can get amylase level\par 2. Repeat Ca 19-9 and MRI/MRCP 9/2022 and RTC after

## 2022-05-24 NOTE — HISTORY OF PRESENT ILLNESS
[de-identified] : Ms. GURVINDER GUPTA is a 66 year old woman who presents today for a follow-up visit for IPMN. Cyst has been present on imaging since 2019, found initially by her GI doctor. Denies any personal history of pancreatitis, no family hx of pancreatic ca. Daughter also being followed for IPMN. PMH of hypothyroidism, lumbar spondylosis and pernicious anemia. Denies nausea, vomiting, diarrhea or abdominal pain. Denies any unintentional weight loss. \par \par MRCP 3/12/22: stable mid-body cysts measuring up to 14mm, compared to CT from 2019\par \par 3/10/22: Ca 19-9: 55\par 5/10/22  Ca 19-9: 48\par \par \par

## 2022-07-21 ENCOUNTER — APPOINTMENT (OUTPATIENT)
Dept: RADIOLOGY | Facility: CLINIC | Age: 67
End: 2022-07-21

## 2022-07-21 ENCOUNTER — OUTPATIENT (OUTPATIENT)
Dept: OUTPATIENT SERVICES | Facility: HOSPITAL | Age: 67
LOS: 1 days | End: 2022-07-21
Payer: MEDICARE

## 2022-07-21 DIAGNOSIS — Z00.00 ENCOUNTER FOR GENERAL ADULT MEDICAL EXAMINATION WITHOUT ABNORMAL FINDINGS: ICD-10-CM

## 2022-07-21 PROCEDURE — 77080 DXA BONE DENSITY AXIAL: CPT

## 2022-07-21 PROCEDURE — 77080 DXA BONE DENSITY AXIAL: CPT | Mod: 26

## 2022-08-02 ENCOUNTER — APPOINTMENT (OUTPATIENT)
Dept: MRI IMAGING | Facility: CLINIC | Age: 67
End: 2022-08-02

## 2022-08-02 ENCOUNTER — OUTPATIENT (OUTPATIENT)
Dept: OUTPATIENT SERVICES | Facility: HOSPITAL | Age: 67
LOS: 1 days | End: 2022-08-02

## 2022-08-02 DIAGNOSIS — D49.0 NEOPLASM OF UNSPECIFIED BEHAVIOR OF DIGESTIVE SYSTEM: ICD-10-CM

## 2022-08-02 PROCEDURE — 74183 MRI ABD W/O CNTR FLWD CNTR: CPT | Mod: 26

## 2022-08-04 LAB — CANCER AG19-9 SERPL-ACNC: 46 U/ML

## 2022-08-09 ENCOUNTER — APPOINTMENT (OUTPATIENT)
Dept: SURGERY | Facility: CLINIC | Age: 67
End: 2022-08-09

## 2022-08-09 VITALS
HEIGHT: 64 IN | SYSTOLIC BLOOD PRESSURE: 106 MMHG | OXYGEN SATURATION: 98 % | RESPIRATION RATE: 16 BRPM | TEMPERATURE: 97.8 F | WEIGHT: 150 LBS | BODY MASS INDEX: 25.61 KG/M2 | DIASTOLIC BLOOD PRESSURE: 75 MMHG | HEART RATE: 64 BPM

## 2022-08-09 PROCEDURE — 99214 OFFICE O/P EST MOD 30 MIN: CPT

## 2022-08-22 NOTE — RESULTS/DATA
[FreeTextEntry1] : EXAM: [id number] - MR MRCP WAW IC  - ORDERED BY: CATHIE ALCOCER\par \par \par PROCEDURE DATE:  03/12/2022\par \par \par \par INTERPRETATION:  CLINICAL INFORMATION: Follow-up pancreatic cysts\par \par COMPARISON: 7/21 CT 2/19\par \par CONTRAST/COMPLICATIONS:\par IV Contrast: Gadavist  7 cc administered   0.5 cc discarded\par Oral Contrast: NONE\par Complications: None reported at time of study completion\par \par PROCEDURE:\par MRI of the abdomen was performed.\par MRCP was performed.\par \par FINDINGS:\par LOWER CHEST: Within normal limits.\par \par LIVER: Multiple hepatic cysts again identified\par BILE DUCTS: Normal caliber.\par GALLBLADDER: Within normal limits.\par SPLEEN: Within normal limits.\par PANCREAS: Stable distribution of scattered pancreatic cysts with stable appearance of mid body cyst measuring 14 mm\par ADRENALS: Within normal limits.\par KIDNEYS/URETERS: Bilateral cysts including left parapelvic renal cysts\par \par VISUALIZED PORTIONS:\par BOWEL: Within normal limits.\par PERITONEUM: No ascites.\par VESSELS: Within normal limits.\par RETROPERITONEUM/LYMPH NODES: No lymphadenopathy.\par ABDOMINAL WALL: Within normal limits.\par BONES: Within normal limits.\par \par IMPRESSION:\par Stable abdominal MRI with stable distribution of small pancreatic cysts suggesting I PMN's and hepatic cysts\par \par \par \par --- End of Report ---\par \par

## 2022-08-22 NOTE — PHYSICAL EXAM
[Normal] : oriented to person, place and time, with appropriate affect [de-identified] : anicteric [de-identified] : supple [de-identified] : normal respiratory effort [de-identified] : normal appearance

## 2022-08-22 NOTE — HISTORY OF PRESENT ILLNESS
[de-identified] : Ms. GURVINDER GUPTA is a 67 year old woman who presents today for a follow-up visit for IPMN. Cyst has been present on imaging since 2019, found initially by her GI doctor. Denies any personal history of pancreatitis, no family hx of pancreatic ca. Daughter also being followed for IPMN. PMH of hypothyroidism, lumbar spondylosis and pernicious anemia. Denies nausea, vomiting, diarrhea or abdominal pain. Denies any unintentional weight loss. 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

## 2022-08-22 NOTE — ASSESSMENT
[FreeTextEntry1] : Ms. GURVINDER GUPTA is a 67 year old woman being seen today for a follow-up of SB-IPMN\par Found incidentally in 2019. Most recent MRCP from 8/2/22 with stable findings.  Repeat Ca 19-9 is 46 (still elevated).   \par \par I personally reviewed her imaging and radiology reports including most recent MRI study. Findings discussed with patient and all questions answered at the time of visit.  \par \par Plan:\par Repeat MRCP and Ca 19-9 level in 6 months\par RTC after above \par \par

## 2022-09-21 ENCOUNTER — OUTPATIENT (OUTPATIENT)
Dept: OUTPATIENT SERVICES | Facility: HOSPITAL | Age: 67
LOS: 1 days | End: 2022-09-21

## 2022-09-21 ENCOUNTER — APPOINTMENT (OUTPATIENT)
Dept: MRI IMAGING | Facility: CLINIC | Age: 67
End: 2022-09-21

## 2022-09-21 DIAGNOSIS — Z00.00 ENCOUNTER FOR GENERAL ADULT MEDICAL EXAMINATION WITHOUT ABNORMAL FINDINGS: ICD-10-CM

## 2022-09-21 PROCEDURE — 73721 MRI JNT OF LWR EXTRE W/O DYE: CPT | Mod: 26,LT

## 2022-09-22 ENCOUNTER — APPOINTMENT (OUTPATIENT)
Dept: ORTHOPEDIC SURGERY | Facility: CLINIC | Age: 67
End: 2022-09-22

## 2022-09-22 VITALS
WEIGHT: 150 LBS | DIASTOLIC BLOOD PRESSURE: 70 MMHG | HEIGHT: 64 IN | SYSTOLIC BLOOD PRESSURE: 105 MMHG | HEART RATE: 76 BPM | BODY MASS INDEX: 25.61 KG/M2

## 2022-09-22 DIAGNOSIS — M25.562 PAIN IN LEFT KNEE: ICD-10-CM

## 2022-09-22 PROCEDURE — 99214 OFFICE O/P EST MOD 30 MIN: CPT

## 2022-09-22 PROCEDURE — 73562 X-RAY EXAM OF KNEE 3: CPT | Mod: LT

## 2022-09-22 RX ORDER — MELOXICAM 7.5 MG/1
7.5 TABLET ORAL TWICE DAILY
Qty: 60 | Refills: 0 | Status: DISCONTINUED | COMMUNITY
Start: 2019-10-02 | End: 2022-09-22

## 2022-09-22 RX ORDER — HYALURONATE SODIUM 10 MG/ML
25 SYRINGE (ML) INTRAARTICULAR
Qty: 5 | Refills: 0 | Status: ACTIVE | OUTPATIENT
Start: 2022-09-22

## 2022-09-22 RX ORDER — MELOXICAM 7.5 MG/1
7.5 TABLET ORAL
Qty: 60 | Refills: 0 | Status: ACTIVE | COMMUNITY
Start: 2022-09-22 | End: 1900-01-01

## 2022-09-22 NOTE — ADDENDUM
[FreeTextEntry1] : This note was authored by Johny Julio working as a medical scribe for Dr. Russell Glover. The note was reviewed, edited, and revised by Dr. Russell Glover whom is in agreement with the exam findings, imaging findings, and treatment plan. 09/22/2022

## 2022-09-22 NOTE — HISTORY OF PRESENT ILLNESS
[de-identified] : Patient is a 67-year-old female presenting for 1 month evaluation of left knee pain.  She denies any specific fall, trauma, or increase in activity.  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  Her knee pain is generalized anteriorly and medially.  She notes intermittent swelling of the knee.  She been taking anti-inflammatories and icing which is helping with the swelling.  Patient went to a walk-in medical 1 month ago and received a steroid injection which worked well for a few weeks but now pain has returned.  She was also sent for an MRI which she brings for review today.  She has not had therapy and is interested in trying this.

## 2022-09-22 NOTE — PHYSICAL EXAM
[de-identified] : The patient appears well nourished  and in no apparent distress.  The patient is alert and oriented to person, place, and time.   Affect and mood appear normal. The head is normocephalic and atraumatic.  The eyes reveal normal sclera and extra ocular muscles are intact. The tongue is midline with no apparent lesions.  Skin shows normal turgor with no evidence of eczema or psoriasis.  No respiratory distress noted.  Sensation grossly intact.		  [de-identified] : Exam of the left knee shows there is minimal effusion. There is medial joint line tenderness. There is no lateral joint line tenderness. There is no pain with range of motion. There is no tenderness over the pes bursa.\par  5/5 motor strength bilaterally distally. Sensation intact distally.		  [de-identified] : X-ray: 4 views of the left knee demonstrate well preserved joint spaces.\par \par Left Knee MRI Done on 9/21/2022 By [facility]\par Impression:\par Moderate cartilage wear along the patella and along the weightbearing portion of the medial femoral condyle. Of note, there is focal full-thickness cartilage loss along the central/anterior weightbearing portion of the medial femoral condyle.\par Discoid lateral meniscus. Menisci are intact.

## 2022-09-22 NOTE — CONSULT LETTER
[Dear  ___] : Dear  [unfilled], [Consult Letter:] : I had the pleasure of evaluating your patient, [unfilled]. [Please see my note below.] : Please see my note below. [Consult Closing:] : Thank you very much for allowing me to participate in the care of this patient.  If you have any questions, please do not hesitate to contact me. [Sincerely,] : Sincerely, [FreeTextEntry2] : JUAN DAVID AHMADI MD\par  [FreeTextEntry3] : Russell Glover MD\par Chief of Joint Replacement\par [facility] \par [facility]\par \par

## 2022-09-22 NOTE — DISCUSSION/SUMMARY
[de-identified] : GURVINDER GUPTA is a 67 year female who presents with left knee moderate patellofemoral compartment arthritis (as seen on MRI) with subchondral stress reaction medial femoral condyle, chondral defect medial femoral condyle. MRI imaging does not reveal a medial meniscus tear.  Nonoperative treatment options were discussed for  her arthritis including antiinflammatories, physical therapy, intraarticular cortisone injections, and hyaluronic acid gel injections. A prescription for physical therapy was provided. A course of Mobic was recommended. The patient was given a prescription for the Mobic with directions. She was instructed to stop the medicine and call the office if there are any adverse reaction to the medicine. She was also instructed to consult with their primary care doctor prior to starting the medication. A series of hyaluronic acid gel injections was ordered for the patient and are pending insurance approval. The office will follow up with the patient when they become available.

## 2022-09-27 ENCOUNTER — APPOINTMENT (OUTPATIENT)
Dept: ORTHOPEDIC SURGERY | Facility: CLINIC | Age: 67
End: 2022-09-27

## 2022-09-27 VITALS
DIASTOLIC BLOOD PRESSURE: 86 MMHG | WEIGHT: 150 LBS | OXYGEN SATURATION: 99 % | HEIGHT: 64 IN | SYSTOLIC BLOOD PRESSURE: 119 MMHG | BODY MASS INDEX: 25.61 KG/M2 | HEART RATE: 72 BPM

## 2022-09-27 PROCEDURE — 99213 OFFICE O/P EST LOW 20 MIN: CPT | Mod: 25

## 2022-09-27 PROCEDURE — 20611 DRAIN/INJ JOINT/BURSA W/US: CPT | Mod: LT

## 2022-09-27 RX ORDER — ALENDRONATE SODIUM 70 MG/1
70 TABLET ORAL
Refills: 0 | Status: ACTIVE | COMMUNITY

## 2022-09-27 RX ORDER — LEVOTHYROXINE SODIUM 137 UG/1
TABLET ORAL
Refills: 0 | Status: ACTIVE | COMMUNITY

## 2022-09-27 NOTE — REASON FOR VISIT
[Follow-Up Visit] : a follow-up visit for [Other: ____] : [unfilled] [FreeTextEntry2] : Right knee GenVisc Injection #1, Lot# [id number], Expires 2025/02/28

## 2022-09-27 NOTE — PROCEDURE
[de-identified] : Allergies: The patient denies allergies to medications and has no allergies to chicken,eggs, or feathers.\par Procedure: The patient has been identified by name and date of birth. Patient confirms that we are treating the left knee today.\par The knee was prepped in the usual sterile fashion. The knee joint space was identified using ultrasound. The area was cleansed with chlorhexadine, then sprayed with ethyl chloride. The patient was then injected with the Genvisc into the left knee using ultrasound guidance  and the needle position in the superolateral joint space was confirmed. The patient tolerated the procedure well. The medication was delivered aseptically and atraumatically.\par Diagnosis: Osteoarthritis of the left knee. \par Treatment: The patient was advised on the activities for today. I gave the patient instructions on postinjection ice and analgesia.\par

## 2022-09-27 NOTE — DISCUSSION/SUMMARY
[de-identified] : GURVINDER GUPTA is a 67 year female who presents with left knee moderate patellofemoral compartment arthritis (as seen on MRI) with subchondral stress reaction medial femoral condyle, chondral defect medial femoral condyle. MRI imaging does not reveal a medial meniscus tear.  Nonoperative treatment options were discussed for  her arthritis including antiinflammatories, physical therapy, intraarticular cortisone injections, and hyaluronic acid gel injections. A course of Diclofenac was recommended. The patient was given a prescription for the Diclofenac with directions. She was instructed to stop the medicine and call the office if there are any adverse reaction to the medicine. She was also instructed to consult with their primary care doctor prior to starting the medication. Patient received the first of five genvisc injections to the left knee and tolerated well. Follow up in one week.

## 2022-09-27 NOTE — HISTORY OF PRESENT ILLNESS
[de-identified] : Patient is a 67-year-old female presenting for 1 month evaluation of left knee pain.  She denies any specific fall, trauma, or increase in activity.  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 Her knee pain is generalized anteriorly and medially.  She notes intermittent swelling of the knee.  She been taking anti-inflammatories and icing which is helping with the swelling. Patient went to a walk-in medical 1 month ago and received a steroid injection which worked well for a few weeks but now pain has returned.  She presents today for the first of five Genvisc injections.

## 2022-09-27 NOTE — PHYSICAL EXAM
[de-identified] : The patient appears well nourished  and in no apparent distress.  The patient is alert and oriented to person, place, and time.   Affect and mood appear normal. The head is normocephalic and atraumatic.  The eyes reveal normal sclera and extra ocular muscles are intact. The tongue is midline with no apparent lesions.  Skin shows normal turgor with no evidence of eczema or psoriasis.  No respiratory distress noted.  Sensation grossly intact.		  [de-identified] : Exam of the left knee shows there is minimal effusion. There is medial joint line tenderness. There is no lateral joint line tenderness. There is no pain with range of motion. There is no tenderness over the pes bursa.\par  5/5 motor strength bilaterally distally. Sensation intact distally.		  [de-identified] : X-ray: 4 views of the left knee demonstrate well preserved joint spaces.\par \par Left Knee MRI Done on 9/21/2022 By [facility]\par Impression:\par Moderate cartilage wear along the patella and along the weightbearing portion of the medial femoral condyle. Of note, there is focal full-thickness cartilage loss along the central/anterior weightbearing portion of the medial femoral condyle.\par Discoid lateral meniscus. Menisci are intact.

## 2022-10-04 ENCOUNTER — APPOINTMENT (OUTPATIENT)
Dept: ORTHOPEDIC SURGERY | Facility: CLINIC | Age: 67
End: 2022-10-04

## 2022-10-04 VITALS — HEIGHT: 64 IN | WEIGHT: 150 LBS | BODY MASS INDEX: 25.61 KG/M2

## 2022-10-04 PROCEDURE — 20611 DRAIN/INJ JOINT/BURSA W/US: CPT | Mod: LT

## 2022-10-04 NOTE — REASON FOR VISIT
[Follow-Up Visit] : a follow-up visit for [Other: ____] : [unfilled] [FreeTextEntry2] : Left knee GenVisc Injection #2, Lot# [id number], Expires 2025/02/28. \par

## 2022-10-04 NOTE — HISTORY OF PRESENT ILLNESS
[de-identified] : Patient is here for the second Genvisc injection for the left knee. \par Allergies: The patient denies allergies to medications and has no allergies to chicken,eggs, or feathers.\par Procedure: The patient has been identified by name and date of birth. Patient confirms that we are treating the left knee today.\par The knee was prepped in the usual sterile fashion. The knee joint space was identified using ultrasound. The area was cleansed with chlorhexadine, then sprayed with ethyl chloride. The patient was then injected with the Genvisc into the left knee using ultrasound guidance  and the needle position in the superolateral joint space was confirmed. The patient tolerated the procedure well. The medication was delivered aseptically and atraumatically.\par Diagnosis: Osteoarthritis of the left knee. \par Treatment: The patient was advised on the activities for today. I gave the patient instructions on postinjection ice and analgesia.\par The patient will follow up in one week for their next injection to be administered.

## 2022-10-11 ENCOUNTER — APPOINTMENT (OUTPATIENT)
Dept: ORTHOPEDIC SURGERY | Facility: CLINIC | Age: 67
End: 2022-10-11

## 2022-10-11 VITALS — HEIGHT: 64 IN | WEIGHT: 150 LBS | BODY MASS INDEX: 25.61 KG/M2

## 2022-10-11 PROCEDURE — 20611 DRAIN/INJ JOINT/BURSA W/US: CPT | Mod: LT

## 2022-10-11 NOTE — HISTORY OF PRESENT ILLNESS
[de-identified] : Patient is here for the third Genvisc injection for the left knee. \par Allergies: The patient denies allergies to medications and has no allergies to chicken,eggs, or feathers.\par Procedure: The patient has been identified by name and date of birth. Patient confirms that we are treating the left knee today.\par The knee was prepped in the usual sterile fashion. The knee joint space was identified using ultrasound. The area was cleansed with chlorhexadine, then sprayed with ethyl chloride. The patient was then injected with the Genvisc into the left knee using ultrasound guidance  and the needle position in the superolateral joint space was confirmed. The patient tolerated the procedure well. The medication was delivered aseptically and atraumatically.\par Diagnosis: Osteoarthritis of the left knee. \par Treatment: The patient was advised on the activities for today. I gave the patient instructions on postinjection ice and analgesia.\par The patient will follow up in one week for their next injection to be administered.

## 2022-10-11 NOTE — REASON FOR VISIT
[Follow-Up Visit] : a follow-up visit for [Other: ____] : [unfilled] [FreeTextEntry2] : Left knee GenVisc Injection #3, Lot# [id number], Expires 2025/02/28. \par

## 2022-10-18 ENCOUNTER — APPOINTMENT (OUTPATIENT)
Dept: ORTHOPEDIC SURGERY | Facility: CLINIC | Age: 67
End: 2022-10-18

## 2022-10-18 VITALS — BODY MASS INDEX: 25.61 KG/M2 | WEIGHT: 150 LBS | HEIGHT: 64 IN

## 2022-10-18 PROCEDURE — 20611 DRAIN/INJ JOINT/BURSA W/US: CPT | Mod: LT

## 2022-10-18 NOTE — HISTORY OF PRESENT ILLNESS
[de-identified] : Patient is here for the fourth Genvisc injection for the left knee. \par Allergies: The patient denies allergies to medications and has no allergies to chicken,eggs, or feathers.\par Procedure: The patient has been identified by name and date of birth. Patient confirms that we are treating the left knee today.\par The knee was prepped in the usual sterile fashion. The knee joint space was identified using ultrasound. The area was cleansed with chlorhexadine, then sprayed with ethyl chloride. The patient was then injected with the Genvisc into the left knee using ultrasound guidance  and the needle position in the superolateral joint space was confirmed. The patient tolerated the procedure well. The medication was delivered aseptically and atraumatically.\par Diagnosis: Osteoarthritis of the left knee. \par Treatment: The patient was advised on the activities for today. I gave the patient instructions on postinjection ice and analgesia.\par The patient will follow up in one week for their next injection to be administered.

## 2022-10-18 NOTE — REASON FOR VISIT
[Follow-Up Visit] : a follow-up visit for [Other: ____] : [unfilled] [FreeTextEntry2] : Left knee GenVisc Injection #4, Lot# [id number], Expires 2025/02/28. \par

## 2022-10-25 ENCOUNTER — APPOINTMENT (OUTPATIENT)
Dept: ORTHOPEDIC SURGERY | Facility: CLINIC | Age: 67
End: 2022-10-25

## 2022-10-25 ENCOUNTER — RX RENEWAL (OUTPATIENT)
Age: 67
End: 2022-10-25

## 2022-10-25 VITALS — BODY MASS INDEX: 25.61 KG/M2 | HEIGHT: 64 IN | WEIGHT: 150 LBS

## 2022-10-25 PROCEDURE — 20611 DRAIN/INJ JOINT/BURSA W/US: CPT | Mod: LT

## 2022-10-25 NOTE — HISTORY OF PRESENT ILLNESS
[de-identified] : Patient is here for the fifth Genvisc injection for the left knee. \par Allergies: The patient denies allergies to medications and has no allergies to chicken,eggs, or feathers.\par Procedure: The patient has been identified by name and date of birth. Patient confirms that we are treating the left knee today.\par The knee was prepped in the usual sterile fashion. The knee joint space was identified using ultrasound. The area was cleansed with chlorhexadine, then sprayed with ethyl chloride. The patient was then injected with the Genvisc into the left knee using ultrasound guidance  and the needle position in the superolateral joint space was confirmed. The patient tolerated the procedure well. The medication was delivered aseptically and atraumatically.\par Diagnosis: Osteoarthritis of the left knee. \par Treatment: The patient was advised on the activities for today. I gave the patient instructions on postinjection ice and analgesia.\par The patient will follow up in 6-8 weeks.

## 2022-10-25 NOTE — REASON FOR VISIT
[Follow-Up Visit] : a follow-up visit for [Other: ____] : [unfilled] [FreeTextEntry2] :  Left knee GenVisc Injection #5, Lot# [id number], Expires 2025/02/28. \par

## 2022-12-13 ENCOUNTER — APPOINTMENT (OUTPATIENT)
Dept: ORTHOPEDIC SURGERY | Facility: CLINIC | Age: 67
End: 2022-12-13

## 2023-02-04 ENCOUNTER — APPOINTMENT (OUTPATIENT)
Dept: MRI IMAGING | Facility: CLINIC | Age: 68
End: 2023-02-04

## 2023-02-04 ENCOUNTER — OUTPATIENT (OUTPATIENT)
Dept: OUTPATIENT SERVICES | Facility: HOSPITAL | Age: 68
LOS: 1 days | End: 2023-02-04
Payer: MEDICARE

## 2023-02-04 DIAGNOSIS — D49.0 NEOPLASM OF UNSPECIFIED BEHAVIOR OF DIGESTIVE SYSTEM: ICD-10-CM

## 2023-02-04 PROCEDURE — 74183 MRI ABD W/O CNTR FLWD CNTR: CPT | Mod: 26

## 2023-02-16 LAB — CANCER AG19-9 SERPL-ACNC: 48 U/ML

## 2023-02-21 ENCOUNTER — APPOINTMENT (OUTPATIENT)
Dept: SURGERY | Facility: CLINIC | Age: 68
End: 2023-02-21
Payer: MEDICARE

## 2023-02-21 VITALS
SYSTOLIC BLOOD PRESSURE: 110 MMHG | OXYGEN SATURATION: 98 % | WEIGHT: 150 LBS | RESPIRATION RATE: 17 BRPM | HEART RATE: 71 BPM | BODY MASS INDEX: 25.61 KG/M2 | DIASTOLIC BLOOD PRESSURE: 73 MMHG | HEIGHT: 64 IN | TEMPERATURE: 97.9 F

## 2023-02-21 PROCEDURE — 99213 OFFICE O/P EST LOW 20 MIN: CPT

## 2023-02-23 NOTE — HISTORY OF PRESENT ILLNESS
[de-identified] : Ms. GURVINDER GUPTA is a 67 year old woman who presents today for a follow-up visit for IPMN. Cyst has been present on imaging since 2019, found initially by her GI doctor. Denies any personal history of pancreatitis, no family hx of pancreatic ca. Daughter also being followed for IPMN. PMH of hypothyroidism, lumbar spondylosis and pernicious anemia. Denies nausea, vomiting, diarrhea or abdominal pain. Denies any unintentional weight loss.  Pt presents today to discuss recent MRI findings. 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 No suspicious enhancing mural nodule or pancreatic duct dilatation.\par \par \par 3/10/22: Ca 19-9: 55\par 5/10/22  Ca 19-9: 48\par 7/26/22  Ca 19-9: 46\par 2/1/23:   Ca 19-9: 48\par

## 2023-02-23 NOTE — ASSESSMENT
[FreeTextEntry1] : Ms. GURVINDER GUPTA is a 67 year old woman being seen today for a follow-up of SB-IPMN\par Found incidentally in 2019. Most recent MRCP from 2/4/23 noting unchanged few small pancreatic cystic lesions, largest is bilobed in pancreatic neck/body junction 1.4 cm. No suspicious enhancing mural nodule or pancreatic duct dilatation.   Pt offers no complaints, denies any pancreatitis history. \par \par I personally reviewed her imaging and radiology reports including most recent MRI study. Findings discussed with patient and all questions answered at the time of visit.  \par \par Plan:\par Check LFTs due to mildly elevated Ca 19-9\par Repeat MRCP and Ca 19-9 in one year\par RTC after (via telehealth)\par \par

## 2023-02-23 NOTE — PHYSICAL EXAM
[Normal] : not distended, non tender, no masses, no hepatosplenomegaly [de-identified] : anicteric [de-identified] : supple [de-identified] : normal respiratory effort [de-identified] : normal appearance

## 2023-03-22 ENCOUNTER — OFFICE (OUTPATIENT)
Dept: URBAN - METROPOLITAN AREA CLINIC 104 | Facility: CLINIC | Age: 68
Setting detail: OPHTHALMOLOGY
End: 2023-03-22
Payer: MEDICARE

## 2023-03-22 ENCOUNTER — RX ONLY (RX ONLY)
Age: 68
End: 2023-03-22

## 2023-03-22 DIAGNOSIS — H01.005: ICD-10-CM

## 2023-03-22 DIAGNOSIS — H04.222: ICD-10-CM

## 2023-03-22 DIAGNOSIS — H01.004: ICD-10-CM

## 2023-03-22 DIAGNOSIS — H01.002: ICD-10-CM

## 2023-03-22 DIAGNOSIS — H40.1131: ICD-10-CM

## 2023-03-22 DIAGNOSIS — H01.001: ICD-10-CM

## 2023-03-22 DIAGNOSIS — H52.4: ICD-10-CM

## 2023-03-22 PROCEDURE — 99213 OFFICE O/P EST LOW 20 MIN: CPT | Performed by: OPTOMETRIST

## 2023-03-22 PROCEDURE — 92015 DETERMINE REFRACTIVE STATE: CPT | Performed by: OPTOMETRIST

## 2023-03-22 ASSESSMENT — PACHYMETRY
OS_CT_UM: 604
OD_CT_UM: 575
OS_CT_CORRECTION: -4
OD_CT_CORRECTION: -2

## 2023-03-22 ASSESSMENT — INCREASING TEAR LAKE - SEVERITY SCORE: OS_INC_TEARLAKE: 2+

## 2023-03-22 ASSESSMENT — CONFRONTATIONAL VISUAL FIELD TEST (CVF)
OS_FINDINGS: FULL
OD_FINDINGS: FULL

## 2023-03-22 ASSESSMENT — TONOMETRY
OD_IOP_MMHG: 18
OS_IOP_MMHG: 16

## 2023-03-23 PROBLEM — H01.001 BLEPHARITIS; RIGHT UPPER LID, RIGHT LOWER LID, LEFT UPPER LID, LEFT LOWER LID: Status: ACTIVE | Noted: 2023-03-22

## 2023-03-23 PROBLEM — H01.004 BLEPHARITIS; RIGHT UPPER LID, RIGHT LOWER LID, LEFT UPPER LID, LEFT LOWER LID: Status: ACTIVE | Noted: 2023-03-22

## 2023-03-23 PROBLEM — H01.005 BLEPHARITIS; RIGHT UPPER LID, RIGHT LOWER LID, LEFT UPPER LID, LEFT LOWER LID: Status: ACTIVE | Noted: 2023-03-22

## 2023-03-23 PROBLEM — H01.002 BLEPHARITIS; RIGHT UPPER LID, RIGHT LOWER LID, LEFT UPPER LID, LEFT LOWER LID: Status: ACTIVE | Noted: 2023-03-22

## 2023-03-23 ASSESSMENT — REFRACTION_CURRENTRX
OD_OVR_VA: 20/
OS_VPRISM_DIRECTION: PROGS
OD_ADD: +2.50
OD_VPRISM_DIRECTION: PROGS
OS_OVR_VA: 20/
OS_SPHERE: +0.50
OS_CYLINDER: -1.00
OS_AXIS: 86
OD_CYLINDER: -1.50
OD_AXIS: 93
OS_ADD: +2.50
OD_SPHERE: +0.75

## 2023-03-23 ASSESSMENT — VISUAL ACUITY
OD_BCVA: 20/20-2
OS_BCVA: 20/20

## 2023-03-23 ASSESSMENT — LID EXAM ASSESSMENTS
OD_BLEPHARITIS: RLL RUL 2+
OS_BLEPHARITIS: LLL LUL 2+

## 2023-03-23 ASSESSMENT — REFRACTION_AUTOREFRACTION
OD_CYLINDER: -1.75
OS_SPHERE: +1.25
OS_CYLINDER: -1.75
OD_SPHERE: +1.25
OS_AXIS: 080
OD_AXIS: 087

## 2023-03-23 ASSESSMENT — SPHEQUIV_DERIVED
OD_SPHEQUIV: 0.5
OS_SPHEQUIV: 0.375
OS_SPHEQUIV: 0.375
OD_SPHEQUIV: 0.375

## 2023-03-23 ASSESSMENT — REFRACTION_MANIFEST
OD_CYLINDER: -1.50
OD_VA2: 20/20(J1+)
OS_ADD: +2.50
OS_VA2: 20/20(J1+)
OS_VA1: 20/20
OU_VA: 20/20
OS_AXIS: 080
OD_SPHERE: +1.25
OD_AXIS: 095
OD_ADD: +2.50
OS_CYLINDER: -1.25
OD_VA1: 20/20
OS_SPHERE: +1.00

## 2023-04-12 ENCOUNTER — RX ONLY (RX ONLY)
Age: 68
End: 2023-04-12

## 2023-04-12 ENCOUNTER — OFFICE (OUTPATIENT)
Dept: URBAN - METROPOLITAN AREA CLINIC 104 | Facility: CLINIC | Age: 68
Setting detail: OPHTHALMOLOGY
End: 2023-04-12
Payer: MEDICARE

## 2023-04-12 DIAGNOSIS — H02.135: ICD-10-CM

## 2023-04-12 DIAGNOSIS — H04.562: ICD-10-CM

## 2023-04-12 DIAGNOSIS — H16.223: ICD-10-CM

## 2023-04-12 DIAGNOSIS — H04.222: ICD-10-CM

## 2023-04-12 PROBLEM — H01.001 BLEPHARITIS; RIGHT UPPER LID, RIGHT LOWER LID, LEFT UPPER LID, LEFT LOWER LID: Status: ACTIVE | Noted: 2023-04-12

## 2023-04-12 PROBLEM — H01.002 BLEPHARITIS; RIGHT UPPER LID, RIGHT LOWER LID, LEFT UPPER LID, LEFT LOWER LID: Status: ACTIVE | Noted: 2023-04-12

## 2023-04-12 PROBLEM — H01.005 BLEPHARITIS; RIGHT UPPER LID, RIGHT LOWER LID, LEFT UPPER LID, LEFT LOWER LID: Status: ACTIVE | Noted: 2023-04-12

## 2023-04-12 PROBLEM — H01.004 BLEPHARITIS; RIGHT UPPER LID, RIGHT LOWER LID, LEFT UPPER LID, LEFT LOWER LID: Status: ACTIVE | Noted: 2023-04-12

## 2023-04-12 PROCEDURE — 68840 EXPLORE/IRRIGATE TEAR DUCTS: CPT | Performed by: OPHTHALMOLOGY

## 2023-04-12 PROCEDURE — 99213 OFFICE O/P EST LOW 20 MIN: CPT | Performed by: OPHTHALMOLOGY

## 2023-04-12 ASSESSMENT — VISUAL ACUITY
OD_BCVA: 20/20-2
OS_BCVA: 20/20

## 2023-04-12 ASSESSMENT — LACRIMAL DUCT - ASSESSMENT: OS_LACRIMAL_DUCT: PASSAGEWAY OPEN UPON IRRIGATION

## 2023-04-12 ASSESSMENT — SPHEQUIV_DERIVED
OD_SPHEQUIV: 0.375
OS_SPHEQUIV: 0.375

## 2023-04-12 ASSESSMENT — LID POSITION - ECTROPION
OD_ECTROPION: ABSENT
OS_ECTROPION: LLL 2+

## 2023-04-12 ASSESSMENT — LID EXAM ASSESSMENTS
OS_BLEPHARITIS: LLL LUL 2+
OD_BLEPHARITIS: RLL RUL 2+

## 2023-04-12 ASSESSMENT — REFRACTION_AUTOREFRACTION
OD_AXIS: 087
OD_CYLINDER: -1.75
OS_AXIS: 080
OS_SPHERE: +1.25
OS_CYLINDER: -1.75
OD_SPHERE: +1.25

## 2023-04-12 ASSESSMENT — INCREASING TEAR LAKE - SEVERITY SCORE: OS_INC_TEARLAKE: 2+

## 2023-04-12 ASSESSMENT — PUNCTA - ASSESSMENT: OS_PUNCTA: SMALL

## 2023-04-12 ASSESSMENT — SUPERFICIAL PUNCTATE KERATITIS (SPK)
OS_SPK: 2+
OD_SPK: 2+

## 2023-04-12 ASSESSMENT — CONFRONTATIONAL VISUAL FIELD TEST (CVF)
OS_FINDINGS: FULL
OD_FINDINGS: FULL

## 2023-04-12 ASSESSMENT — LID POSITION - COMMENTS: OS_COMMENTS: PUNCTAL ECTROPION

## 2023-04-13 PROBLEM — H04.562 STENOSIS LACRIMAL PUNCTUM; LEFT EYE: Status: ACTIVE | Noted: 2023-04-12

## 2023-04-18 ENCOUNTER — RX RENEWAL (OUTPATIENT)
Age: 68
End: 2023-04-18

## 2023-04-18 RX ORDER — DICLOFENAC SODIUM 75 MG/1
75 TABLET, DELAYED RELEASE ORAL
Qty: 60 | Refills: 0 | Status: ACTIVE | COMMUNITY
Start: 2022-09-27 | End: 1900-01-01

## 2023-07-17 ENCOUNTER — APPOINTMENT (OUTPATIENT)
Dept: ULTRASOUND IMAGING | Facility: CLINIC | Age: 68
End: 2023-07-17
Payer: MEDICARE

## 2023-07-17 ENCOUNTER — OUTPATIENT (OUTPATIENT)
Dept: OUTPATIENT SERVICES | Facility: HOSPITAL | Age: 68
LOS: 1 days | End: 2023-07-17
Payer: MEDICARE

## 2023-07-17 DIAGNOSIS — Z00.8 ENCOUNTER FOR OTHER GENERAL EXAMINATION: ICD-10-CM

## 2023-07-17 PROCEDURE — 76536 US EXAM OF HEAD AND NECK: CPT | Mod: 26

## 2023-07-17 PROCEDURE — 76536 US EXAM OF HEAD AND NECK: CPT

## 2023-09-27 ENCOUNTER — APPOINTMENT (OUTPATIENT)
Dept: MRI IMAGING | Facility: CLINIC | Age: 68
End: 2023-09-27

## 2023-09-29 ENCOUNTER — APPOINTMENT (OUTPATIENT)
Dept: ORTHOPEDIC SURGERY | Facility: CLINIC | Age: 68
End: 2023-09-29

## 2023-12-27 ENCOUNTER — APPOINTMENT (OUTPATIENT)
Dept: ORTHOPEDIC SURGERY | Facility: CLINIC | Age: 68
End: 2023-12-27
Payer: MEDICARE

## 2023-12-27 VITALS
SYSTOLIC BLOOD PRESSURE: 118 MMHG | BODY MASS INDEX: 25.61 KG/M2 | HEART RATE: 74 BPM | WEIGHT: 150 LBS | DIASTOLIC BLOOD PRESSURE: 70 MMHG | HEIGHT: 64 IN | OXYGEN SATURATION: 99 %

## 2023-12-27 PROCEDURE — 99214 OFFICE O/P EST MOD 30 MIN: CPT | Mod: 25

## 2023-12-27 PROCEDURE — 20611 DRAIN/INJ JOINT/BURSA W/US: CPT | Mod: LT

## 2023-12-27 NOTE — REASON FOR VISIT
[Follow-Up Visit] : a follow-up visit for [Other: ____] : [unfilled] [FreeTextEntry2] : Left knee Genvisc Injection #1, Lot# [id number], Expires 03/31/2026.

## 2023-12-27 NOTE — PHYSICAL EXAM
[de-identified] : Multi body exam  The patient appears well nourished and in no apparent distress. The patient is alert and oriented to person, place, and time. Affect and mood appear normal. The head is normocephalic and atraumatic. The eyes reveal normal sclera and extra ocular muscles are intact. The tongue is midline with no apparent lesions. Skin shows normal turgor with no evidence of eczema or psoriasis. No respiratory distress noted. Sensation grossly intact.   [de-identified] : Exam left Knee: Skin is within normal limits. ROM 0-120 with pain at deep flexion. Mild tenderness to medial joint line.  Eam right knee: Skin within normal limits. ROM 0-125 without pain. no joint line tenderness.   [de-identified] : Xray 4 views left knee: mild medial and patellofemoral joint space narrowing  Xray 4 views right knee: well preserved joint spaces.

## 2023-12-27 NOTE — DISCUSSION/SUMMARY
[de-identified] : Ms. GURVINDER GUPTA is a 68 year old female with left knee OA. Conservative options were discussed. She received the first of five genvisc injections and tolerated well. Follow up in one week.

## 2023-12-27 NOTE — PROCEDURE
[de-identified] : Allergies: The patient denies allergies to medications and has no allergies to chicken,eggs, or feathers. Procedure: The patient has been identified by name and date of birth. Patient confirms that we are treating the left knee today. The knee was prepped in the usual sterile fashion. The knee joint space was identified using ultrasound. The area was cleansed with chlorhexadine, then sprayed with ethyl chloride. The patient was then injected with the Genvisc into the left knee using ultrasound guidance  and the needle position in the superolateral joint space was confirmed. The patient tolerated the procedure well. The medication was delivered aseptically and atraumatically. Diagnosis: Osteoarthritis of the left knee.  Treatment: The patient was advised on the activities for today. I gave the patient instructions on postinjection ice and analgesia.

## 2023-12-27 NOTE — HISTORY OF PRESENT ILLNESS
[de-identified] : Ms. GURVINDER GUPTA is a 68 year old female presenting for evaluation of bilateral knee pain, left worse than right. Her left knee pain is localized medially and anteriorly. Patient notes the pain is worse with all weightbearing activity including walking long distance and using the stairs. Patient had a steroid injection in August with out improvement. In October 2022 she had gel shots to the left knee only which worked very well. She has also tried PT and NSAIDs without improvement. She is hopeful to discuss injections again today.

## 2024-01-05 ENCOUNTER — APPOINTMENT (OUTPATIENT)
Dept: ORTHOPEDIC SURGERY | Facility: CLINIC | Age: 69
End: 2024-01-05
Payer: MEDICARE

## 2024-01-05 VITALS — BODY MASS INDEX: 25.61 KG/M2 | HEIGHT: 64 IN | WEIGHT: 150 LBS

## 2024-01-05 DIAGNOSIS — M17.12 UNILATERAL PRIMARY OSTEOARTHRITIS, LEFT KNEE: ICD-10-CM

## 2024-01-05 PROCEDURE — 20610 DRAIN/INJ JOINT/BURSA W/O US: CPT | Mod: LT

## 2024-01-05 NOTE — HISTORY OF PRESENT ILLNESS
[de-identified] : The patient is here today for a genvisc injection for the left knee. The patient is having osteoarthritic symptoms.  Allergies: The patient denies allergies to medications and has no allergies to chicken,eggs, or feathers. Procedure: The patient has been identified by name and date of birth. Patient confirms that we are treating the left knee today. The knee was prepped in the usual sterile fashion. The areas were cleansed with chlorhexadine, then sprayed with ethyl chloride. The patient was then injected with the Genvisc into the left knee in the anterolateral position. The patient tolerated the procedure well. The medication was delivered aseptically and atraumatically. Diagnosis: Osteoarthritis of the left knee Treatment: The patient was advised on the activities for today. I gave the patient instructions on postinjection ice and analgesia. Follow up in one week for the next injection.

## 2024-01-05 NOTE — REASON FOR VISIT
[Follow-Up Visit] : a follow-up visit for [Other: ____] : [unfilled] [FreeTextEntry2] : Left knee Genvisc Injection #2, Lot# [id number], Expires 03/31/2026.

## 2024-01-12 ENCOUNTER — APPOINTMENT (OUTPATIENT)
Dept: ORTHOPEDIC SURGERY | Facility: CLINIC | Age: 69
End: 2024-01-12

## 2024-01-19 ENCOUNTER — OUTPATIENT (OUTPATIENT)
Dept: OUTPATIENT SERVICES | Facility: HOSPITAL | Age: 69
LOS: 1 days | End: 2024-01-19

## 2024-01-19 ENCOUNTER — APPOINTMENT (OUTPATIENT)
Dept: MRI IMAGING | Facility: CLINIC | Age: 69
End: 2024-01-19
Payer: MEDICARE

## 2024-01-19 DIAGNOSIS — Z00.00 ENCOUNTER FOR GENERAL ADULT MEDICAL EXAMINATION WITHOUT ABNORMAL FINDINGS: ICD-10-CM

## 2024-01-19 PROCEDURE — 73721 MRI JNT OF LWR EXTRE W/O DYE: CPT | Mod: 26,LT,MH

## 2024-01-26 ENCOUNTER — APPOINTMENT (OUTPATIENT)
Dept: ORTHOPEDIC SURGERY | Facility: CLINIC | Age: 69
End: 2024-01-26

## 2024-02-10 ENCOUNTER — APPOINTMENT (OUTPATIENT)
Dept: MRI IMAGING | Facility: CLINIC | Age: 69
End: 2024-02-10

## 2024-02-10 ENCOUNTER — OUTPATIENT (OUTPATIENT)
Dept: OUTPATIENT SERVICES | Facility: HOSPITAL | Age: 69
LOS: 1 days | End: 2024-02-10
Payer: MEDICARE

## 2024-02-10 DIAGNOSIS — D49.0 NEOPLASM OF UNSPECIFIED BEHAVIOR OF DIGESTIVE SYSTEM: ICD-10-CM

## 2024-02-10 PROCEDURE — 74183 MRI ABD W/O CNTR FLWD CNTR: CPT | Mod: 26

## 2024-02-11 LAB — CANCER AG19-9 SERPL-ACNC: 52 U/ML

## 2024-02-16 ENCOUNTER — NON-APPOINTMENT (OUTPATIENT)
Age: 69
End: 2024-02-16

## 2024-02-20 ENCOUNTER — APPOINTMENT (OUTPATIENT)
Dept: SURGERY | Facility: CLINIC | Age: 69
End: 2024-02-20
Payer: MEDICARE

## 2024-02-20 VITALS
RESPIRATION RATE: 17 BRPM | HEIGHT: 64 IN | WEIGHT: 150 LBS | SYSTOLIC BLOOD PRESSURE: 129 MMHG | DIASTOLIC BLOOD PRESSURE: 85 MMHG | BODY MASS INDEX: 25.61 KG/M2 | OXYGEN SATURATION: 97 % | HEART RATE: 71 BPM

## 2024-02-20 DIAGNOSIS — D49.0 NEOPLASM OF UNSPECIFIED BEHAVIOR OF DIGESTIVE SYSTEM: ICD-10-CM

## 2024-02-20 PROCEDURE — 99212 OFFICE O/P EST SF 10 MIN: CPT

## 2024-02-27 ENCOUNTER — APPOINTMENT (OUTPATIENT)
Dept: SURGERY | Facility: CLINIC | Age: 69
End: 2024-02-27

## 2024-03-12 PROBLEM — D49.0 IPMN (INTRADUCTAL PAPILLARY MUCINOUS NEOPLASM): Status: ACTIVE | Noted: 2022-05-17

## 2024-06-25 ENCOUNTER — APPOINTMENT (OUTPATIENT)
Dept: MRI IMAGING | Facility: CLINIC | Age: 69
End: 2024-06-25

## 2025-02-01 ENCOUNTER — APPOINTMENT (OUTPATIENT)
Dept: MRI IMAGING | Facility: CLINIC | Age: 70
End: 2025-02-01

## 2025-02-01 ENCOUNTER — OUTPATIENT (OUTPATIENT)
Dept: OUTPATIENT SERVICES | Facility: HOSPITAL | Age: 70
LOS: 1 days | End: 2025-02-01

## 2025-02-01 DIAGNOSIS — D49.0 NEOPLASM OF UNSPECIFIED BEHAVIOR OF DIGESTIVE SYSTEM: ICD-10-CM

## 2025-02-01 PROCEDURE — 74183 MRI ABD W/O CNTR FLWD CNTR: CPT | Mod: 26

## 2025-02-20 ENCOUNTER — APPOINTMENT (OUTPATIENT)
Dept: SURGICAL ONCOLOGY | Facility: CLINIC | Age: 70
End: 2025-02-20
Payer: MEDICARE

## 2025-02-20 DIAGNOSIS — D49.0 NEOPLASM OF UNSPECIFIED BEHAVIOR OF DIGESTIVE SYSTEM: ICD-10-CM

## 2025-02-20 PROCEDURE — 99213 OFFICE O/P EST LOW 20 MIN: CPT | Mod: 2W

## 2025-09-13 ENCOUNTER — APPOINTMENT (OUTPATIENT)
Dept: ULTRASOUND IMAGING | Facility: CLINIC | Age: 70
End: 2025-09-13

## 2025-09-13 ENCOUNTER — APPOINTMENT (OUTPATIENT)
Dept: RADIOLOGY | Facility: CLINIC | Age: 70
End: 2025-09-13